# Patient Record
Sex: MALE | Race: WHITE | Employment: OTHER | URBAN - METROPOLITAN AREA
[De-identification: names, ages, dates, MRNs, and addresses within clinical notes are randomized per-mention and may not be internally consistent; named-entity substitution may affect disease eponyms.]

---

## 2017-01-01 ENCOUNTER — HOSPITAL ENCOUNTER (OUTPATIENT)
Dept: LAB | Age: 82
Discharge: HOME | End: 2017-02-27
Payer: MEDICARE

## 2017-01-01 ENCOUNTER — HOME CARE VISIT (OUTPATIENT)
Dept: HOSPICE | Facility: HOSPICE | Age: 82
End: 2017-01-01
Payer: MEDICARE

## 2017-01-01 ENCOUNTER — HOSPITAL ENCOUNTER (OUTPATIENT)
Dept: CT IMAGING | Age: 82
Discharge: HOME | End: 2017-03-22
Attending: INTERNAL MEDICINE

## 2017-01-01 ENCOUNTER — HOSPITAL ENCOUNTER (OUTPATIENT)
Dept: LAB | Age: 82
Discharge: HOME | End: 2017-02-24
Payer: MEDICARE

## 2017-01-01 ENCOUNTER — HOSPITAL ENCOUNTER (OUTPATIENT)
Dept: RADIATION ONCOLOGY | Age: 82
Discharge: HOME | End: 2017-02-21
Payer: MEDICARE

## 2017-01-01 ENCOUNTER — HOSPITAL ENCOUNTER (OUTPATIENT)
Dept: GENERAL RADIOLOGY | Age: 82
Discharge: HOME | End: 2017-03-20
Attending: INTERNAL MEDICINE
Payer: MEDICARE

## 2017-01-01 ENCOUNTER — HOSPITAL ENCOUNTER (OUTPATIENT)
Dept: LAB | Age: 82
Discharge: HOME | End: 2017-03-20
Attending: INTERNAL MEDICINE
Payer: MEDICARE

## 2017-01-01 ENCOUNTER — HOME CARE VISIT (OUTPATIENT)
Dept: SCHEDULING | Facility: HOME HEALTH | Age: 82
End: 2017-01-01
Payer: MEDICARE

## 2017-01-01 ENCOUNTER — HOSPITAL ENCOUNTER (INPATIENT)
Age: 82
LOS: 1 days | Discharge: HOME HEALTH | DRG: 478 | End: 2017-03-04
Attending: RADIOLOGY | Admitting: RADIOLOGY
Payer: MEDICARE

## 2017-01-01 ENCOUNTER — PATIENT OUTREACH (OUTPATIENT)
Dept: CASE MANAGEMENT | Age: 82
End: 2017-01-01

## 2017-01-01 ENCOUNTER — HOSPITAL ENCOUNTER (OUTPATIENT)
Dept: LAB | Age: 82
Discharge: HOME | End: 2017-04-12
Payer: MEDICARE

## 2017-01-01 ENCOUNTER — HOSPITAL ENCOUNTER (OUTPATIENT)
Dept: LAB | Age: 82
Discharge: HOME | End: 2017-03-15
Payer: MEDICARE

## 2017-01-01 ENCOUNTER — HOSPITAL ENCOUNTER (OUTPATIENT)
Dept: LAB | Age: 82
Discharge: HOME | End: 2017-03-17
Payer: MEDICARE

## 2017-01-01 ENCOUNTER — APPOINTMENT (OUTPATIENT)
Dept: INTERVENTIONAL RADIOLOGY/VASCULAR | Age: 82
DRG: 478 | End: 2017-01-01
Attending: RADIOLOGY
Payer: MEDICARE

## 2017-01-01 ENCOUNTER — HOSPITAL ENCOUNTER (OUTPATIENT)
Dept: LAB | Age: 82
Discharge: HOME | End: 2017-03-21
Payer: MEDICARE

## 2017-01-01 ENCOUNTER — DOCUMENTATION ONLY (OUTPATIENT)
Dept: ONCOLOGY | Age: 82
End: 2017-01-01

## 2017-01-01 ENCOUNTER — ANESTHESIA (OUTPATIENT)
Dept: SURGERY | Age: 82
DRG: 478 | End: 2017-01-01
Payer: MEDICARE

## 2017-01-01 ENCOUNTER — HOSPITAL ENCOUNTER (OUTPATIENT)
Dept: CT IMAGING | Age: 82
Discharge: HOME | End: 2017-03-23
Attending: INTERNAL MEDICINE
Payer: MEDICARE

## 2017-01-01 ENCOUNTER — HOSPITAL ENCOUNTER (OUTPATIENT)
Dept: LAB | Age: 82
Discharge: HOME | End: 2017-03-29
Payer: MEDICARE

## 2017-01-01 ENCOUNTER — APPOINTMENT (OUTPATIENT)
Dept: GENERAL RADIOLOGY | Age: 82
DRG: 478 | End: 2017-01-01
Attending: INTERNAL MEDICINE
Payer: MEDICARE

## 2017-01-01 ENCOUNTER — HOSPITAL ENCOUNTER (OUTPATIENT)
Dept: LAB | Age: 82
Discharge: HOME | End: 2017-02-21
Payer: MEDICARE

## 2017-01-01 ENCOUNTER — HOSPICE ADMISSION (OUTPATIENT)
Dept: HOSPICE | Facility: HOSPICE | Age: 82
End: 2017-01-01
Payer: MEDICARE

## 2017-01-01 ENCOUNTER — ANESTHESIA EVENT (OUTPATIENT)
Dept: SURGERY | Age: 82
DRG: 478 | End: 2017-01-01
Payer: MEDICARE

## 2017-01-01 ENCOUNTER — HOSPITAL ENCOUNTER (OUTPATIENT)
Dept: LAB | Age: 82
Discharge: HOME | End: 2017-07-05
Payer: MEDICARE

## 2017-01-01 ENCOUNTER — HOSPITAL ENCOUNTER (OUTPATIENT)
Dept: PET IMAGING | Age: 82
Discharge: HOME | End: 2017-03-14
Payer: MEDICARE

## 2017-01-01 VITALS
HEART RATE: 67 BPM | RESPIRATION RATE: 16 BRPM | BODY MASS INDEX: 21.47 KG/M2 | DIASTOLIC BLOOD PRESSURE: 92 MMHG | OXYGEN SATURATION: 99 % | TEMPERATURE: 97.3 F | SYSTOLIC BLOOD PRESSURE: 171 MMHG | HEIGHT: 70 IN | WEIGHT: 150 LBS

## 2017-01-01 VITALS
OXYGEN SATURATION: 99 % | WEIGHT: 150 LBS | HEART RATE: 75 BPM | TEMPERATURE: 97.8 F | RESPIRATION RATE: 14 BRPM | HEIGHT: 70 IN | SYSTOLIC BLOOD PRESSURE: 148 MMHG | BODY MASS INDEX: 21.47 KG/M2 | DIASTOLIC BLOOD PRESSURE: 70 MMHG

## 2017-01-01 VITALS
HEART RATE: 57 BPM | DIASTOLIC BLOOD PRESSURE: 88 MMHG | WEIGHT: 149.2 LBS | TEMPERATURE: 98.7 F | OXYGEN SATURATION: 97 % | BODY MASS INDEX: 21.41 KG/M2 | SYSTOLIC BLOOD PRESSURE: 148 MMHG

## 2017-01-01 VITALS — DIASTOLIC BLOOD PRESSURE: 80 MMHG | HEART RATE: 58 BPM | SYSTOLIC BLOOD PRESSURE: 150 MMHG | RESPIRATION RATE: 20 BRPM

## 2017-01-01 VITALS
TEMPERATURE: 97.2 F | DIASTOLIC BLOOD PRESSURE: 74 MMHG | HEART RATE: 80 BPM | SYSTOLIC BLOOD PRESSURE: 132 MMHG | RESPIRATION RATE: 20 BRPM

## 2017-01-01 VITALS
RESPIRATION RATE: 20 BRPM | HEART RATE: 80 BPM | DIASTOLIC BLOOD PRESSURE: 76 MMHG | TEMPERATURE: 97.2 F | SYSTOLIC BLOOD PRESSURE: 126 MMHG

## 2017-01-01 DIAGNOSIS — C64.9 RENAL CELL CARCINOMA, UNSPECIFIED LATERALITY (HCC): ICD-10-CM

## 2017-01-01 DIAGNOSIS — Z79.01 CHRONIC ANTICOAGULATION: Chronic | ICD-10-CM

## 2017-01-01 DIAGNOSIS — M89.9 LYTIC LESION OF BONE ON X-RAY: ICD-10-CM

## 2017-01-01 DIAGNOSIS — R30.0 DYSURIA: ICD-10-CM

## 2017-01-01 DIAGNOSIS — M54.9 BACK PAIN, UNSPECIFIED BACK LOCATION, UNSPECIFIED BACK PAIN LATERALITY, UNSPECIFIED CHRONICITY: ICD-10-CM

## 2017-01-01 DIAGNOSIS — C90.00 MULTIPLE MYELOMA NOT HAVING ACHIEVED REMISSION (HCC): ICD-10-CM

## 2017-01-01 DIAGNOSIS — C79.51 METASTASIS TO BONE (HCC): ICD-10-CM

## 2017-01-01 DIAGNOSIS — R97.0 ELEVATED CARCINOEMBRYONIC ANTIGEN: ICD-10-CM

## 2017-01-01 DIAGNOSIS — C64.9 RENAL CELL CANCER, UNSPECIFIED LATERALITY (HCC): ICD-10-CM

## 2017-01-01 DIAGNOSIS — C64.9 RENAL CELL ADENOCARCINOMA, UNSPECIFIED LATERALITY (HCC): ICD-10-CM

## 2017-01-01 DIAGNOSIS — S32.009A CLOSED FRACTURE OF LUMBAR VERTEBRA (HCC): ICD-10-CM

## 2017-01-01 LAB
ALBUMIN SERPL BCP-MCNC: 3.2 G/DL (ref 3.2–4.6)
ALBUMIN SERPL BCP-MCNC: 3.3 G/DL (ref 3.2–4.6)
ALBUMIN SERPL BCP-MCNC: 3.3 G/DL (ref 3.2–4.6)
ALBUMIN SERPL BCP-MCNC: 3.4 G/DL (ref 3.2–4.6)
ALBUMIN SERPL BCP-MCNC: 3.5 G/DL (ref 3.2–4.6)
ALBUMIN SERPL BCP-MCNC: 3.8 G/DL (ref 3.2–4.6)
ALBUMIN SERPL ELPH-MCNC: 3.82 G/DL (ref 3.2–5.6)
ALBUMIN UR ELPH-MCNC: 22.1 MG/DL
ALBUMIN/GLOB SERPL: 0.2 {RATIO}
ALBUMIN/GLOB SERPL: 0.8 {RATIO} (ref 1.2–3.5)
ALBUMIN/GLOB SERPL: 0.9 {RATIO} (ref 1.2–3.5)
ALBUMIN/GLOB SERPL: 1.1 {RATIO} (ref 1.2–3.5)
ALBUMIN/GLOB SERPL: 1.2 {RATIO} (ref 1.2–3.5)
ALBUMIN/GLOB SERPL: 1.3 {RATIO} (ref 1.2–3.5)
ALBUMIN/GLOB SERPL: 1.4 {RATIO} (ref 1.2–3.5)
ALBUMIN/GLOB SERPL: 1.8 {RATIO}
ALP SERPL-CCNC: 110 U/L (ref 50–136)
ALP SERPL-CCNC: 125 U/L (ref 50–136)
ALP SERPL-CCNC: 132 U/L (ref 50–136)
ALP SERPL-CCNC: 134 U/L (ref 50–136)
ALP SERPL-CCNC: 88 U/L (ref 50–136)
ALP SERPL-CCNC: 99 U/L (ref 50–136)
ALPHA1 GLOB 24H UR ELPH-MCNC: 1.9 MG/DL
ALPHA1 GLOB SERPL ELPH-MCNC: 0.23 G/DL (ref 0.1–0.4)
ALPHA2 GLOB SERPL ELPH-MCNC: 0.63 G/DL (ref 0.4–1.2)
ALPHA2 GLOB SERPL ELPH-MCNC: 4.4 MG/DL
ALT SERPL-CCNC: 13 U/L (ref 12–65)
ALT SERPL-CCNC: 15 U/L (ref 12–65)
ALT SERPL-CCNC: 17 U/L (ref 12–65)
ALT SERPL-CCNC: 22 U/L (ref 12–65)
ALT SERPL-CCNC: 23 U/L (ref 12–65)
ALT SERPL-CCNC: 24 U/L (ref 12–65)
ANION GAP BLD CALC-SCNC: 11 MMOL/L (ref 7–16)
ANION GAP BLD CALC-SCNC: 4 MMOL/L (ref 7–16)
ANION GAP BLD CALC-SCNC: 5 MMOL/L (ref 7–16)
ANION GAP BLD CALC-SCNC: 6 MMOL/L (ref 7–16)
ANION GAP BLD CALC-SCNC: 6 MMOL/L (ref 7–16)
ANION GAP BLD CALC-SCNC: 7 MMOL/L (ref 7–16)
ANION GAP BLD CALC-SCNC: 7 MMOL/L (ref 7–16)
ANION GAP BLD CALC-SCNC: 9 MMOL/L (ref 7–16)
APTT PPP: 34.2 SEC (ref 23.5–31.7)
APTT PPP: 64.8 SEC (ref 23.5–31.7)
AST SERPL W P-5'-P-CCNC: 11 U/L (ref 15–37)
AST SERPL W P-5'-P-CCNC: 11 U/L (ref 15–37)
AST SERPL W P-5'-P-CCNC: 14 U/L (ref 15–37)
AST SERPL W P-5'-P-CCNC: 16 U/L (ref 15–37)
AST SERPL W P-5'-P-CCNC: 18 U/L (ref 15–37)
AST SERPL W P-5'-P-CCNC: 18 U/L (ref 15–37)
B-GLOBULIN SERPL QL ELPH: 0.79 G/DL (ref 0.6–1.3)
B-GLOBULIN UR QL ELPH: 71.7 MG/DL
B2 MICROGLOB SERPL-MCNC: 4.7 MG/L (ref 0.8–2.34)
BASOPHILS # BLD AUTO: 0 K/UL (ref 0–0.2)
BASOPHILS # BLD: 0 % (ref 0–2)
BASOPHILS # BLD: 1 % (ref 0–2)
BILIRUB SERPL-MCNC: 0.4 MG/DL (ref 0.2–1.1)
BILIRUB SERPL-MCNC: 0.5 MG/DL (ref 0.2–1.1)
BILIRUB SERPL-MCNC: 0.5 MG/DL (ref 0.2–1.1)
BILIRUB SERPL-MCNC: 0.6 MG/DL (ref 0.2–1.1)
BILIRUB SERPL-MCNC: 0.6 MG/DL (ref 0.2–1.1)
BILIRUB SERPL-MCNC: 0.7 MG/DL (ref 0.2–1.1)
BONE MARROW PREP & W,BMA: NORMAL
BUN SERPL-MCNC: 21 MG/DL (ref 8–23)
BUN SERPL-MCNC: 23 MG/DL (ref 8–23)
BUN SERPL-MCNC: 24 MG/DL (ref 8–23)
BUN SERPL-MCNC: 24 MG/DL (ref 8–23)
BUN SERPL-MCNC: 25 MG/DL (ref 8–23)
BUN SERPL-MCNC: 25 MG/DL (ref 8–23)
BUN SERPL-MCNC: 28 MG/DL (ref 8–23)
BUN SERPL-MCNC: 28 MG/DL (ref 8–23)
CALCIUM SERPL-MCNC: 10 MG/DL (ref 8.3–10.4)
CALCIUM SERPL-MCNC: 9.1 MG/DL (ref 8.3–10.4)
CALCIUM SERPL-MCNC: 9.3 MG/DL (ref 8.3–10.4)
CALCIUM SERPL-MCNC: 9.4 MG/DL (ref 8.3–10.4)
CALCIUM SERPL-MCNC: 9.5 MG/DL (ref 8.3–10.4)
CALCIUM SERPL-MCNC: 9.6 MG/DL (ref 8.3–10.4)
CALCIUM SERPL-MCNC: 9.6 MG/DL (ref 8.3–10.4)
CALCIUM SERPL-MCNC: 9.8 MG/DL (ref 8.3–10.4)
CEA SERPL-MCNC: 6.2 NG/ML (ref 0–3)
CHLORIDE SERPL-SCNC: 106 MMOL/L (ref 98–107)
CHLORIDE SERPL-SCNC: 107 MMOL/L (ref 98–107)
CHLORIDE SERPL-SCNC: 108 MMOL/L (ref 98–107)
CHLORIDE SERPL-SCNC: 109 MMOL/L (ref 98–107)
CO2 SERPL-SCNC: 25 MMOL/L (ref 23–32)
CO2 SERPL-SCNC: 26 MMOL/L (ref 21–32)
CO2 SERPL-SCNC: 27 MMOL/L (ref 21–32)
CO2 SERPL-SCNC: 27 MMOL/L (ref 23–32)
CO2 SERPL-SCNC: 28 MMOL/L (ref 23–32)
CO2 SERPL-SCNC: 30 MMOL/L (ref 23–32)
CO2 SERPL-SCNC: 30 MMOL/L (ref 23–32)
CO2 SERPL-SCNC: 32 MMOL/L (ref 21–32)
COLLECT DURATION TIME UR: 24 HR
CREAT SERPL-MCNC: 1.49 MG/DL (ref 0.8–1.5)
CREAT SERPL-MCNC: 1.52 MG/DL (ref 0.8–1.5)
CREAT SERPL-MCNC: 1.54 MG/DL (ref 0.8–1.5)
CREAT SERPL-MCNC: 1.56 MG/DL (ref 0.8–1.5)
CREAT SERPL-MCNC: 1.56 MG/DL (ref 0.8–1.5)
CREAT SERPL-MCNC: 1.62 MG/DL (ref 0.8–1.5)
CREAT SERPL-MCNC: 1.66 MG/DL (ref 0.8–1.5)
CREAT SERPL-MCNC: 1.79 MG/DL (ref 0.8–1.5)
DIFFERENTIAL METHOD BLD: ABNORMAL
EOSINOPHIL # BLD: 0 K/UL (ref 0–0.8)
EOSINOPHIL # BLD: 0.1 K/UL (ref 0–0.8)
EOSINOPHIL # BLD: 0.2 K/UL (ref 0–0.8)
EOSINOPHIL NFR BLD: 0 % (ref 0.5–7.8)
EOSINOPHIL NFR BLD: 1 % (ref 0.5–7.8)
EOSINOPHIL NFR BLD: 2 % (ref 0.5–7.8)
EOSINOPHIL NFR BLD: 2 % (ref 0.5–7.8)
EOSINOPHIL NFR BLD: 3 % (ref 0.5–7.8)
EOSINOPHIL NFR BLD: 3 % (ref 0.5–7.8)
ERYTHROCYTE [DISTWIDTH] IN BLOOD BY AUTOMATED COUNT: 12.4 % (ref 11.9–14.6)
ERYTHROCYTE [DISTWIDTH] IN BLOOD BY AUTOMATED COUNT: 12.5 % (ref 11.9–14.6)
ERYTHROCYTE [DISTWIDTH] IN BLOOD BY AUTOMATED COUNT: 12.8 % (ref 11.9–14.6)
ERYTHROCYTE [DISTWIDTH] IN BLOOD BY AUTOMATED COUNT: 12.9 % (ref 11.9–14.6)
ERYTHROCYTE [DISTWIDTH] IN BLOOD BY AUTOMATED COUNT: 12.9 % (ref 11.9–14.6)
ERYTHROCYTE [DISTWIDTH] IN BLOOD BY AUTOMATED COUNT: 13 % (ref 11.9–14.6)
ERYTHROCYTE [DISTWIDTH] IN BLOOD BY AUTOMATED COUNT: 13.2 % (ref 11.9–14.6)
ERYTHROCYTE [DISTWIDTH] IN BLOOD BY AUTOMATED COUNT: 13.2 % (ref 11.9–14.6)
ERYTHROCYTE [DISTWIDTH] IN BLOOD BY AUTOMATED COUNT: 13.5 % (ref 11.9–14.6)
GAMMA GLOB MFR SERPL ELPH: 0.43 G/DL (ref 0.5–1.6)
GAMMA GLOB MFR UR ELPH: 12.9 MG/DL
GLOBULIN SER CALC-MCNC: 2.7 G/DL (ref 2.3–3.5)
GLOBULIN SER CALC-MCNC: 2.8 G/DL (ref 2.3–3.5)
GLOBULIN SER CALC-MCNC: 2.8 G/DL (ref 2.3–3.5)
GLOBULIN SER CALC-MCNC: 3.1 G/DL (ref 2.3–3.5)
GLOBULIN SER CALC-MCNC: 3.7 G/DL (ref 2.3–3.5)
GLOBULIN SER CALC-MCNC: 3.8 G/DL (ref 2.3–3.5)
GLUCOSE SERPL-MCNC: 101 MG/DL (ref 65–100)
GLUCOSE SERPL-MCNC: 101 MG/DL (ref 65–100)
GLUCOSE SERPL-MCNC: 104 MG/DL (ref 65–100)
GLUCOSE SERPL-MCNC: 116 MG/DL (ref 65–100)
GLUCOSE SERPL-MCNC: 120 MG/DL (ref 65–100)
GLUCOSE SERPL-MCNC: 121 MG/DL (ref 65–100)
GLUCOSE SERPL-MCNC: 171 MG/DL (ref 65–100)
GLUCOSE SERPL-MCNC: 99 MG/DL (ref 65–100)
HCT VFR BLD AUTO: 36.5 % (ref 41.1–50.3)
HCT VFR BLD AUTO: 38.7 % (ref 41.1–50.3)
HCT VFR BLD AUTO: 40.6 % (ref 41.1–50.3)
HCT VFR BLD AUTO: 40.8 % (ref 41.1–50.3)
HCT VFR BLD AUTO: 44.2 % (ref 41.1–50.3)
HCT VFR BLD AUTO: 45 % (ref 41.1–50.3)
HCT VFR BLD AUTO: 45.2 % (ref 41.1–50.3)
HCT VFR BLD AUTO: 45.5 % (ref 41.1–50.3)
HCT VFR BLD AUTO: 47.1 % (ref 41.1–50.3)
HGB BLD-MCNC: 12.4 G/DL (ref 13.6–17.2)
HGB BLD-MCNC: 12.4 G/DL (ref 13.6–17.2)
HGB BLD-MCNC: 13.2 G/DL (ref 13.6–17.2)
HGB BLD-MCNC: 13.3 G/DL (ref 13.6–17.2)
HGB BLD-MCNC: 14.4 G/DL (ref 13.6–17.2)
HGB BLD-MCNC: 14.6 G/DL (ref 13.6–17.2)
HGB BLD-MCNC: 14.9 G/DL (ref 13.6–17.2)
HGB BLD-MCNC: 15 G/DL (ref 13.6–17.2)
HGB BLD-MCNC: 15.8 G/DL (ref 13.6–17.2)
IGA SERPL-MCNC: 34 MG/DL (ref 85–499)
IGG SERPL-MCNC: 447 MG/DL (ref 610–1616)
IGM SERPL-MCNC: 28 MG/DL (ref 35–242)
IMM GRANULOCYTES # BLD: 0 K/UL (ref 0–0.5)
IMM GRANULOCYTES # BLD: 0 K/UL (ref 0–0.5)
IMM GRANULOCYTES NFR BLD AUTO: 0.2 % (ref 0–5)
IMM GRANULOCYTES NFR BLD AUTO: 0.3 % (ref 0–5)
INR BLD: NORMAL
INR BLD: NORMAL
INR PPP: 1.1 (ref 0.9–1.2)
INR PPP: 1.2 (ref 0.9–1.2)
INR PPP: 1.5 (ref 0.9–1.2)
INR PPP: 2 (ref 0.9–1.2)
INR PPP: 2.2 (ref 0.9–1.2)
INR PPP: >9 (ref 0.9–1.2)
KAPPA LC FREE SER-MCNC: 22.1 MG/L (ref 3.3–19.4)
KAPPA LC FREE SER-MCNC: 24.14 MG/L (ref 3.3–19.4)
KAPPA LC FREE/LAMBDA FREE SER: 0.01 {RATIO} (ref 0.26–1.65)
KAPPA LC FREE/LAMBDA FREE SER: 0.02 {RATIO} (ref 0.26–1.65)
LAMBDA LC FREE SERPL-MCNC: 1074 MG/L (ref 5.71–26.3)
LAMBDA LC FREE SERPL-MCNC: 3070 MG/L (ref 5.71–26.3)
LYMPHOCYTES # BLD AUTO: 11 % (ref 13–44)
LYMPHOCYTES # BLD AUTO: 17 % (ref 13–44)
LYMPHOCYTES # BLD AUTO: 21 % (ref 13–44)
LYMPHOCYTES # BLD AUTO: 22 % (ref 13–44)
LYMPHOCYTES # BLD AUTO: 27 % (ref 13–44)
LYMPHOCYTES # BLD AUTO: 30 % (ref 13–44)
LYMPHOCYTES # BLD AUTO: 7 % (ref 13–44)
LYMPHOCYTES # BLD AUTO: 8 % (ref 13–44)
LYMPHOCYTES # BLD: 0.8 K/UL (ref 0.5–4.6)
LYMPHOCYTES # BLD: 0.9 K/UL (ref 0.5–4.6)
LYMPHOCYTES # BLD: 1.1 K/UL (ref 0.5–4.6)
LYMPHOCYTES # BLD: 1.1 K/UL (ref 0.5–4.6)
LYMPHOCYTES # BLD: 1.2 K/UL (ref 0.5–4.6)
LYMPHOCYTES # BLD: 1.2 K/UL (ref 0.5–4.6)
LYMPHOCYTES # BLD: 1.4 K/UL (ref 0.5–4.6)
LYMPHOCYTES # BLD: 1.5 K/UL (ref 0.5–4.6)
M PROTEIN 24H UR ELPH-MRATE: 837 MG/24HR
M PROTEIN SERPL ELPH-MCNC: 0.22 G/DL
M PROTEIN UR-MCNC: 60.9 MG/DL
MAGNESIUM SERPL-MCNC: 2.4 MG/DL (ref 1.8–2.4)
MAGNESIUM SERPL-MCNC: 2.5 MG/DL (ref 1.8–2.4)
MAGNESIUM SERPL-MCNC: 2.6 MG/DL (ref 1.8–2.4)
MCH RBC QN AUTO: 28.8 PG (ref 26.1–32.9)
MCH RBC QN AUTO: 28.9 PG (ref 26.1–32.9)
MCH RBC QN AUTO: 29 PG (ref 26.1–32.9)
MCH RBC QN AUTO: 29 PG (ref 26.1–32.9)
MCH RBC QN AUTO: 29.5 PG (ref 26.1–32.9)
MCH RBC QN AUTO: 30.4 PG (ref 26.1–32.9)
MCHC RBC AUTO-ENTMCNC: 32 G/DL (ref 31.4–35)
MCHC RBC AUTO-ENTMCNC: 32.4 G/DL (ref 31.4–35)
MCHC RBC AUTO-ENTMCNC: 32.5 G/DL (ref 31.4–35)
MCHC RBC AUTO-ENTMCNC: 32.6 G/DL (ref 31.4–35)
MCHC RBC AUTO-ENTMCNC: 32.6 G/DL (ref 31.4–35)
MCHC RBC AUTO-ENTMCNC: 32.7 G/DL (ref 31.4–35)
MCHC RBC AUTO-ENTMCNC: 33.2 G/DL (ref 31.4–35)
MCHC RBC AUTO-ENTMCNC: 33.5 G/DL (ref 31.4–35)
MCHC RBC AUTO-ENTMCNC: 34 G/DL (ref 31.4–35)
MCV RBC AUTO: 87.4 FL (ref 79.6–97.8)
MCV RBC AUTO: 87.9 FL (ref 79.6–97.8)
MCV RBC AUTO: 88 FL (ref 79.6–97.8)
MCV RBC AUTO: 88.6 FL (ref 79.6–97.8)
MCV RBC AUTO: 88.7 FL (ref 79.6–97.8)
MCV RBC AUTO: 88.8 FL (ref 79.6–97.8)
MCV RBC AUTO: 88.9 FL (ref 79.6–97.8)
MCV RBC AUTO: 89.5 FL (ref 79.6–97.8)
MCV RBC AUTO: 89.8 FL (ref 79.6–97.8)
MM INDURATION POC: 0 MM (ref 0–5)
MM INDURATION POC: NORMAL 0 (ref 0–5)
MM INDURATION POC: NORMAL MM (ref 0–5)
MONOCYTES # BLD: 0.4 K/UL (ref 0.1–1.3)
MONOCYTES # BLD: 0.6 K/UL (ref 0.1–1.3)
MONOCYTES # BLD: 0.7 K/UL (ref 0.1–1.3)
MONOCYTES # BLD: 0.8 K/UL (ref 0.1–1.3)
MONOCYTES NFR BLD AUTO: 5 % (ref 4–12)
MONOCYTES NFR BLD AUTO: 6 % (ref 4–12)
MONOCYTES NFR BLD AUTO: 7 % (ref 4–12)
MONOCYTES NFR BLD AUTO: 9 % (ref 4–12)
MONOCYTES NFR BLD AUTO: 9 % (ref 4–12)
NEUTS SEG # BLD: 13.1 K/UL (ref 1.7–8.2)
NEUTS SEG # BLD: 2.7 K/UL (ref 1.7–8.2)
NEUTS SEG # BLD: 2.9 K/UL (ref 1.7–8.2)
NEUTS SEG # BLD: 3.7 K/UL (ref 1.7–8.2)
NEUTS SEG # BLD: 4.4 K/UL (ref 1.7–8.2)
NEUTS SEG # BLD: 4.6 K/UL (ref 1.7–8.2)
NEUTS SEG # BLD: 6.6 K/UL (ref 1.7–8.2)
NEUTS SEG # BLD: 8.6 K/UL (ref 1.7–8.2)
NEUTS SEG NFR BLD AUTO: 58 % (ref 43–78)
NEUTS SEG NFR BLD AUTO: 61 % (ref 43–78)
NEUTS SEG NFR BLD AUTO: 68 % (ref 43–78)
NEUTS SEG NFR BLD AUTO: 71 % (ref 43–78)
NEUTS SEG NFR BLD AUTO: 74 % (ref 43–78)
NEUTS SEG NFR BLD AUTO: 81 % (ref 43–78)
NEUTS SEG NFR BLD AUTO: 85 % (ref 43–78)
NEUTS SEG NFR BLD AUTO: 87 % (ref 43–78)
NRBC # BLD: 0 K/UL (ref 0–0.2)
NRBC # BLD: 0.01 K/UL (ref 0–0.2)
NRBC BLD-RTO: 0 PER 100 WBC (ref 0–2)
PLATELET # BLD AUTO: 178 K/UL (ref 150–450)
PLATELET # BLD AUTO: 187 K/UL (ref 150–450)
PLATELET # BLD AUTO: 197 K/UL (ref 150–450)
PLATELET # BLD AUTO: 209 K/UL (ref 150–450)
PLATELET # BLD AUTO: 211 K/UL (ref 150–450)
PLATELET # BLD AUTO: 247 K/UL (ref 150–450)
PLATELET # BLD AUTO: 247 K/UL (ref 150–450)
PLATELET # BLD AUTO: 372 K/UL (ref 150–450)
PLATELET # BLD AUTO: 395 K/UL (ref 150–450)
PMV BLD AUTO: 10.2 FL (ref 10.8–14.1)
PMV BLD AUTO: 10.4 FL (ref 10.8–14.1)
PMV BLD AUTO: 9.3 FL (ref 10.8–14.1)
PMV BLD AUTO: 9.3 FL (ref 10.8–14.1)
PMV BLD AUTO: 9.5 FL (ref 10.8–14.1)
PMV BLD AUTO: 9.5 FL (ref 10.8–14.1)
PMV BLD AUTO: 9.6 FL (ref 10.8–14.1)
PMV BLD AUTO: 9.6 FL (ref 10.8–14.1)
PMV BLD AUTO: 9.8 FL (ref 10.8–14.1)
POTASSIUM SERPL-SCNC: 4 MMOL/L (ref 3.5–5.1)
POTASSIUM SERPL-SCNC: 4.1 MMOL/L (ref 3.5–5.1)
POTASSIUM SERPL-SCNC: 4.2 MMOL/L (ref 3.5–5.1)
POTASSIUM SERPL-SCNC: 4.4 MMOL/L (ref 3.5–5.1)
POTASSIUM SERPL-SCNC: 4.7 MMOL/L (ref 3.5–5.1)
POTASSIUM SERPL-SCNC: 4.8 MMOL/L (ref 3.5–5.1)
PPD POC: 0 NEGATIVE
PPD POC: NORMAL NEGATIVE
PROT 24H UR-MRATE: 1554 MG/24HR
PROT PATTERN SERPL ELPH-IMP: ABNORMAL
PROT PATTERN SPEC IFE-IMP: ABNORMAL
PROT PATTERN SPEC IFE-IMP: NORMAL
PROT PATTERN UR ELPH-IMP: ABNORMAL
PROT SERPL-MCNC: 5.9 G/DL (ref 6.3–8.2)
PROT SERPL-MCNC: 6.2 G/DL (ref 6.3–8.2)
PROT SERPL-MCNC: 6.2 G/DL (ref 6.3–8.2)
PROT SERPL-MCNC: 6.4 G/DL (ref 6.3–8.2)
PROT SERPL-MCNC: 6.6 G/DL (ref 6.3–8.2)
PROT SERPL-MCNC: 7 G/DL (ref 6.3–8.2)
PROT SERPL-MCNC: 7 G/DL (ref 6.3–8.2)
PROT UR-MCNC: 113 MG/DL
PROTHROMBIN TIME: 12.4 SEC (ref 9.6–12)
PROTHROMBIN TIME: 12.6 SEC (ref 9.6–12)
PROTHROMBIN TIME: 16.1 SEC (ref 9.6–12)
PROTHROMBIN TIME: 22.3 SEC (ref 9.6–12)
PROTHROMBIN TIME: 25.9 SEC (ref 9.6–12)
PROTHROMBIN TIME: >90 SEC (ref 9.6–12)
RBC # BLD AUTO: 4.08 M/UL (ref 4.23–5.67)
RBC # BLD AUTO: 4.31 M/UL (ref 4.23–5.67)
RBC # BLD AUTO: 4.58 M/UL (ref 4.23–5.67)
RBC # BLD AUTO: 4.6 M/UL (ref 4.23–5.67)
RBC # BLD AUTO: 4.97 M/UL (ref 4.23–5.67)
RBC # BLD AUTO: 5.07 M/UL (ref 4.23–5.67)
RBC # BLD AUTO: 5.17 M/UL (ref 4.23–5.67)
RBC # BLD AUTO: 5.17 M/UL (ref 4.23–5.67)
RBC # BLD AUTO: 5.36 M/UL (ref 4.23–5.67)
SODIUM SERPL-SCNC: 140 MMOL/L (ref 136–145)
SODIUM SERPL-SCNC: 142 MMOL/L (ref 136–145)
SODIUM SERPL-SCNC: 142 MMOL/L (ref 136–145)
SODIUM SERPL-SCNC: 143 MMOL/L (ref 136–145)
SODIUM SERPL-SCNC: 143 MMOL/L (ref 136–145)
SODIUM SERPL-SCNC: 144 MMOL/L (ref 136–145)
SPECIMEN VOL ?TM UR: 1375 ML
WBC # BLD AUTO: 10 K/UL (ref 4.3–11.1)
WBC # BLD AUTO: 15.1 K/UL (ref 4.3–11.1)
WBC # BLD AUTO: 4.4 K/UL (ref 4.3–11.1)
WBC # BLD AUTO: 4.9 K/UL (ref 4.3–11.1)
WBC # BLD AUTO: 5 K/UL (ref 4.3–11.1)
WBC # BLD AUTO: 5.4 K/UL (ref 4.3–11.1)
WBC # BLD AUTO: 6.2 K/UL (ref 4.3–11.1)
WBC # BLD AUTO: 6.2 K/UL (ref 4.3–11.1)
WBC # BLD AUTO: 8.1 K/UL (ref 4.3–11.1)

## 2017-01-01 PROCEDURE — 88305 TISSUE EXAM BY PATHOLOGIST: CPT

## 2017-01-01 PROCEDURE — 99218 HC RM OBSERVATION: CPT

## 2017-01-01 PROCEDURE — 36415 COLL VENOUS BLD VENIPUNCTURE: CPT | Performed by: INTERNAL MEDICINE

## 2017-01-01 PROCEDURE — 88365 INSITU HYBRIDIZATION (FISH): CPT

## 2017-01-01 PROCEDURE — G0299 HHS/HOSPICE OF RN EA 15 MIN: HCPCS

## 2017-01-01 PROCEDURE — 85025 COMPLETE CBC W/AUTO DIFF WBC: CPT | Performed by: INTERNAL MEDICINE

## 2017-01-01 PROCEDURE — 99152 MOD SED SAME PHYS/QHP 5/>YRS: CPT

## 2017-01-01 PROCEDURE — 0Q503ZZ DESTRUCTION OF LUMBAR VERTEBRA, PERCUTANEOUS APPROACH: ICD-10-PCS | Performed by: RADIOLOGY

## 2017-01-01 PROCEDURE — 3336500001 HSPC ELECTION

## 2017-01-01 PROCEDURE — 88305 TISSUE EXAM BY PATHOLOGIST: CPT | Performed by: RADIOLOGY

## 2017-01-01 PROCEDURE — 80053 COMPREHEN METABOLIC PANEL: CPT | Performed by: INTERNAL MEDICINE

## 2017-01-01 PROCEDURE — 88368 INSITU HYBRIDIZATION MANUAL: CPT

## 2017-01-01 PROCEDURE — 74011250636 HC RX REV CODE- 250/636: Performed by: INTERNAL MEDICINE

## 2017-01-01 PROCEDURE — 77030003666 HC NDL SPINAL BD -A

## 2017-01-01 PROCEDURE — 0651 HSPC ROUTINE HOME CARE

## 2017-01-01 PROCEDURE — 73502 X-RAY EXAM HIP UNI 2-3 VIEWS: CPT

## 2017-01-01 PROCEDURE — 85730 THROMBOPLASTIN TIME PARTIAL: CPT | Performed by: INTERNAL MEDICINE

## 2017-01-01 PROCEDURE — 77074 RADEX OSSEOUS SURVEY LMTD: CPT

## 2017-01-01 PROCEDURE — A9552 F18 FDG: HCPCS

## 2017-01-01 PROCEDURE — G8978 MOBILITY CURRENT STATUS: HCPCS

## 2017-01-01 PROCEDURE — 74011000250 HC RX REV CODE- 250: Performed by: RADIOLOGY

## 2017-01-01 PROCEDURE — 0QB03ZX EXCISION OF LUMBAR VERTEBRA, PERCUTANEOUS APPROACH, DIAGNOSTIC: ICD-10-PCS | Performed by: RADIOLOGY

## 2017-01-01 PROCEDURE — 85610 PROTHROMBIN TIME: CPT | Performed by: INTERNAL MEDICINE

## 2017-01-01 PROCEDURE — G0155 HHCP-SVS OF CSW,EA 15 MIN: HCPCS

## 2017-01-01 PROCEDURE — 74011250637 HC RX REV CODE- 250/637: Performed by: INTERNAL MEDICINE

## 2017-01-01 PROCEDURE — 74011250636 HC RX REV CODE- 250/636: Performed by: RADIOLOGY

## 2017-01-01 PROCEDURE — 0QU03JZ SUPPLEMENT LUMBAR VERTEBRA WITH SYNTHETIC SUBSTITUTE, PERCUTANEOUS APPROACH: ICD-10-PCS | Performed by: RADIOLOGY

## 2017-01-01 PROCEDURE — 74011250637 HC RX REV CODE- 250/637: Performed by: PHYSICIAN ASSISTANT

## 2017-01-01 PROCEDURE — 86334 IMMUNOFIX E-PHORESIS SERUM: CPT | Performed by: INTERNAL MEDICINE

## 2017-01-01 PROCEDURE — 88342 IMHCHEM/IMCYTCHM 1ST ANTB: CPT | Performed by: RADIOLOGY

## 2017-01-01 PROCEDURE — 88237 TISSUE CULTURE BONE MARROW: CPT

## 2017-01-01 PROCEDURE — 88185 FLOWCYTOMETRY/TC ADD-ON: CPT

## 2017-01-01 PROCEDURE — 85610 PROTHROMBIN TIME: CPT | Performed by: RADIOLOGY

## 2017-01-01 PROCEDURE — 74011250636 HC RX REV CODE- 250/636: Performed by: PHYSICIAN ASSISTANT

## 2017-01-01 PROCEDURE — 83883 ASSAY NEPHELOMETRY NOT SPEC: CPT | Performed by: INTERNAL MEDICINE

## 2017-01-01 PROCEDURE — 77030011218 HC DEV BIOP BN KYPH -C

## 2017-01-01 PROCEDURE — G8979 MOBILITY GOAL STATUS: HCPCS

## 2017-01-01 PROCEDURE — 88312 SPECIAL STAINS GROUP 1: CPT

## 2017-01-01 PROCEDURE — 81050 URINALYSIS VOLUME MEASURE: CPT | Performed by: INTERNAL MEDICINE

## 2017-01-01 PROCEDURE — 85610 PROTHROMBIN TIME: CPT | Performed by: PHYSICIAN ASSISTANT

## 2017-01-01 PROCEDURE — 84166 PROTEIN E-PHORESIS/URINE/CSF: CPT | Performed by: INTERNAL MEDICINE

## 2017-01-01 PROCEDURE — 83735 ASSAY OF MAGNESIUM: CPT | Performed by: INTERNAL MEDICINE

## 2017-01-01 PROCEDURE — 88342 IMHCHEM/IMCYTCHM 1ST ANTB: CPT

## 2017-01-01 PROCEDURE — 36415 COLL VENOUS BLD VENIPUNCTURE: CPT | Performed by: PHYSICIAN ASSISTANT

## 2017-01-01 PROCEDURE — 97116 GAIT TRAINING THERAPY: CPT

## 2017-01-01 PROCEDURE — 85027 COMPLETE CBC AUTOMATED: CPT | Performed by: RADIOLOGY

## 2017-01-01 PROCEDURE — 88112 CYTOPATH CELL ENHANCE TECH: CPT

## 2017-01-01 PROCEDURE — 74011250636 HC RX REV CODE- 250/636

## 2017-01-01 PROCEDURE — 82232 ASSAY OF BETA-2 PROTEIN: CPT | Performed by: INTERNAL MEDICINE

## 2017-01-01 PROCEDURE — C1886 CATHETER, ABLATION: HCPCS

## 2017-01-01 PROCEDURE — 36415 COLL VENOUS BLD VENIPUNCTURE: CPT | Performed by: RADIOLOGY

## 2017-01-01 PROCEDURE — 76060000034 HC ANESTHESIA 1.5 TO 2 HR: Performed by: RADIOLOGY

## 2017-01-01 PROCEDURE — 20982 ABLATE BONE TUMOR(S) PERQ: CPT

## 2017-01-01 PROCEDURE — 97530 THERAPEUTIC ACTIVITIES: CPT

## 2017-01-01 PROCEDURE — 77030037492 HC KT BN ACCS OSTEOCOOL MEDT -E

## 2017-01-01 PROCEDURE — HOSPICE MEDICATION HC HH HOSPICE MEDICATION

## 2017-01-01 PROCEDURE — 88184 FLOWCYTOMETRY/ TC 1 MARKER: CPT

## 2017-01-01 PROCEDURE — 38221 DX BONE MARROW BIOPSIES: CPT

## 2017-01-01 PROCEDURE — 65270000029 HC RM PRIVATE

## 2017-01-01 PROCEDURE — 74011000302 HC RX REV CODE- 302: Performed by: INTERNAL MEDICINE

## 2017-01-01 PROCEDURE — C1713 ANCHOR/SCREW BN/BN,TIS/BN: HCPCS

## 2017-01-01 PROCEDURE — 97161 PT EVAL LOW COMPLEX 20 MIN: CPT

## 2017-01-01 PROCEDURE — 88280 CHROMOSOME KARYOTYPE STUDY: CPT

## 2017-01-01 PROCEDURE — 99211 OFF/OP EST MAY X REQ PHY/QHP: CPT

## 2017-01-01 PROCEDURE — 88311 DECALCIFY TISSUE: CPT | Performed by: RADIOLOGY

## 2017-01-01 PROCEDURE — 88364 INSITU HYBRIDIZATION (FISH): CPT

## 2017-01-01 PROCEDURE — 84165 PROTEIN E-PHORESIS SERUM: CPT | Performed by: INTERNAL MEDICINE

## 2017-01-01 PROCEDURE — 77030007884 HC KT SPN FX KYPH -I2

## 2017-01-01 PROCEDURE — 74011000250 HC RX REV CODE- 250

## 2017-01-01 PROCEDURE — 77030018846 HC SOL IRR STRL H20 ICUM -A

## 2017-01-01 PROCEDURE — 36416 COLLJ CAPILLARY BLOOD SPEC: CPT | Performed by: INTERNAL MEDICINE

## 2017-01-01 PROCEDURE — 88313 SPECIAL STAINS GROUP 2: CPT

## 2017-01-01 PROCEDURE — 80048 BASIC METABOLIC PNL TOTAL CA: CPT | Performed by: RADIOLOGY

## 2017-01-01 PROCEDURE — 88311 DECALCIFY TISSUE: CPT

## 2017-01-01 PROCEDURE — 80048 BASIC METABOLIC PNL TOTAL CA: CPT | Performed by: INTERNAL MEDICINE

## 2017-01-01 PROCEDURE — 86580 TB INTRADERMAL TEST: CPT | Performed by: INTERNAL MEDICINE

## 2017-01-01 PROCEDURE — 97165 OT EVAL LOW COMPLEX 30 MIN: CPT

## 2017-01-01 PROCEDURE — 88264 CHROMOSOME ANALYSIS 20-25: CPT

## 2017-01-01 PROCEDURE — 88377 M/PHMTRC ALYS ISHQUANT/SEMIQ: CPT

## 2017-01-01 PROCEDURE — 86335 IMMUNFIX E-PHORSIS/URINE/CSF: CPT | Performed by: INTERNAL MEDICINE

## 2017-01-01 PROCEDURE — 85730 THROMBOPLASTIN TIME PARTIAL: CPT | Performed by: RADIOLOGY

## 2017-01-01 PROCEDURE — 85025 COMPLETE CBC W/AUTO DIFF WBC: CPT | Performed by: RADIOLOGY

## 2017-01-01 RX ORDER — IBUPROFEN 600 MG/1
600 TABLET ORAL
Status: DISCONTINUED | OUTPATIENT
Start: 2017-01-01 | End: 2017-01-01 | Stop reason: HOSPADM

## 2017-01-01 RX ORDER — NALOXONE HYDROCHLORIDE 0.4 MG/ML
0.1 INJECTION, SOLUTION INTRAMUSCULAR; INTRAVENOUS; SUBCUTANEOUS
Status: CANCELLED | OUTPATIENT
Start: 2017-01-01

## 2017-01-01 RX ORDER — WARFARIN 2 MG/1
2 TABLET ORAL
Status: COMPLETED | OUTPATIENT
Start: 2017-01-01 | End: 2017-01-01

## 2017-01-01 RX ORDER — DOCUSATE SODIUM 100 MG/1
100 CAPSULE, LIQUID FILLED ORAL 2 TIMES DAILY
Status: DISCONTINUED | OUTPATIENT
Start: 2017-01-01 | End: 2017-01-01 | Stop reason: HOSPADM

## 2017-01-01 RX ORDER — SODIUM CHLORIDE 9 MG/ML
INJECTION, SOLUTION INTRAVENOUS
Status: DISCONTINUED | OUTPATIENT
Start: 2017-01-01 | End: 2017-01-01 | Stop reason: HOSPADM

## 2017-01-01 RX ORDER — OXYCODONE HYDROCHLORIDE 5 MG/1
5 TABLET ORAL
Status: DISCONTINUED | OUTPATIENT
Start: 2017-01-01 | End: 2017-01-01 | Stop reason: HOSPADM

## 2017-01-01 RX ORDER — BUPIVACAINE HYDROCHLORIDE 5 MG/ML
5-50 INJECTION, SOLUTION EPIDURAL; INTRACAUDAL ONCE
Status: CANCELLED | OUTPATIENT
Start: 2017-01-01 | End: 2017-01-01

## 2017-01-01 RX ORDER — DOCUSATE SODIUM 100 MG/1
100 CAPSULE, LIQUID FILLED ORAL 2 TIMES DAILY
Qty: 60 CAP | Refills: 0 | Status: SHIPPED | OUTPATIENT
Start: 2017-01-01 | End: 2017-01-01

## 2017-01-01 RX ORDER — WARFARIN 2.5 MG/1
2.5 TABLET ORAL EVERY EVENING
Status: DISCONTINUED | OUTPATIENT
Start: 2017-01-01 | End: 2017-01-01

## 2017-01-01 RX ORDER — MIDAZOLAM HYDROCHLORIDE 1 MG/ML
.5-2 INJECTION, SOLUTION INTRAMUSCULAR; INTRAVENOUS
Status: DISCONTINUED | OUTPATIENT
Start: 2017-01-01 | End: 2017-01-01 | Stop reason: ALTCHOICE

## 2017-01-01 RX ORDER — HYDROGEN PEROXIDE 3 %
SOLUTION, NON-ORAL MISCELLANEOUS ONCE
Status: ACTIVE | OUTPATIENT
Start: 2017-01-01 | End: 2017-01-01

## 2017-01-01 RX ORDER — LIDOCAINE HYDROCHLORIDE 10 MG/ML
0.1 INJECTION INFILTRATION; PERINEURAL AS NEEDED
Status: CANCELLED | OUTPATIENT
Start: 2017-01-01

## 2017-01-01 RX ORDER — AMLODIPINE BESYLATE 10 MG/1
10 TABLET ORAL DAILY
Qty: 90 TAB | Refills: 0 | Status: SHIPPED | OUTPATIENT
Start: 2017-01-01 | End: 2017-01-01

## 2017-01-01 RX ORDER — CEFAZOLIN SODIUM IN 0.9 % NACL 2 G/50 ML
2 INTRAVENOUS SOLUTION, PIGGYBACK (ML) INTRAVENOUS ONCE
Status: COMPLETED | OUTPATIENT
Start: 2017-01-01 | End: 2017-01-01

## 2017-01-01 RX ORDER — OXYCODONE HYDROCHLORIDE 5 MG/1
5 TABLET ORAL
Qty: 30 TAB | Refills: 0 | Status: SHIPPED | OUTPATIENT
Start: 2017-01-01 | End: 2017-01-01 | Stop reason: ALTCHOICE

## 2017-01-01 RX ORDER — LEVOTHYROXINE SODIUM 75 UG/1
75 TABLET ORAL
Status: DISCONTINUED | OUTPATIENT
Start: 2017-01-01 | End: 2017-01-01 | Stop reason: HOSPADM

## 2017-01-01 RX ORDER — LIDOCAINE HYDROCHLORIDE 20 MG/ML
.5-2 INJECTION, SOLUTION INFILTRATION; PERINEURAL ONCE
Status: COMPLETED | OUTPATIENT
Start: 2017-01-01 | End: 2017-01-01

## 2017-01-01 RX ORDER — DIPHENHYDRAMINE HYDROCHLORIDE 50 MG/ML
25-50 INJECTION, SOLUTION INTRAMUSCULAR; INTRAVENOUS ONCE
Status: DISCONTINUED | OUTPATIENT
Start: 2017-01-01 | End: 2017-01-01 | Stop reason: ALTCHOICE

## 2017-01-01 RX ORDER — SODIUM CHLORIDE 9 MG/ML
150 INJECTION, SOLUTION INTRAVENOUS CONTINUOUS
Status: ACTIVE | OUTPATIENT
Start: 2017-01-01 | End: 2017-01-01

## 2017-01-01 RX ORDER — SODIUM CHLORIDE 0.9 % (FLUSH) 0.9 %
5-10 SYRINGE (ML) INJECTION EVERY 8 HOURS
Status: DISCONTINUED | OUTPATIENT
Start: 2017-01-01 | End: 2017-01-01 | Stop reason: HOSPADM

## 2017-01-01 RX ORDER — LABETALOL HYDROCHLORIDE 5 MG/ML
INJECTION, SOLUTION INTRAVENOUS AS NEEDED
Status: DISCONTINUED | OUTPATIENT
Start: 2017-01-01 | End: 2017-01-01 | Stop reason: HOSPADM

## 2017-01-01 RX ORDER — BUPIVACAINE HYDROCHLORIDE 5 MG/ML
5-50 INJECTION, SOLUTION EPIDURAL; INTRACAUDAL ONCE
Status: COMPLETED | OUTPATIENT
Start: 2017-01-01 | End: 2017-01-01

## 2017-01-01 RX ORDER — POLYETHYLENE GLYCOL 3350 17 G/17G
17 POWDER, FOR SOLUTION ORAL DAILY
Status: DISCONTINUED | OUTPATIENT
Start: 2017-01-01 | End: 2017-01-01 | Stop reason: HOSPADM

## 2017-01-01 RX ORDER — ONDANSETRON 2 MG/ML
4 INJECTION INTRAMUSCULAR; INTRAVENOUS
Status: DISCONTINUED | OUTPATIENT
Start: 2017-01-01 | End: 2017-01-01 | Stop reason: HOSPADM

## 2017-01-01 RX ORDER — HYDRALAZINE HYDROCHLORIDE 20 MG/ML
10 INJECTION INTRAMUSCULAR; INTRAVENOUS
Status: DISCONTINUED | OUTPATIENT
Start: 2017-01-01 | End: 2017-01-01 | Stop reason: HOSPADM

## 2017-01-01 RX ORDER — FENTANYL CITRATE 50 UG/ML
INJECTION, SOLUTION INTRAMUSCULAR; INTRAVENOUS AS NEEDED
Status: DISCONTINUED | OUTPATIENT
Start: 2017-01-01 | End: 2017-01-01 | Stop reason: HOSPADM

## 2017-01-01 RX ORDER — HYDROCODONE BITARTRATE AND ACETAMINOPHEN 5; 325 MG/1; MG/1
1-2 TABLET ORAL
Status: DISCONTINUED | OUTPATIENT
Start: 2017-01-01 | End: 2017-01-01

## 2017-01-01 RX ORDER — SODIUM CHLORIDE 0.9 % (FLUSH) 0.9 %
10 SYRINGE (ML) INJECTION
Status: COMPLETED | OUTPATIENT
Start: 2017-01-01 | End: 2017-01-01

## 2017-01-01 RX ORDER — AMLODIPINE BESYLATE 10 MG/1
10 TABLET ORAL DAILY
Status: DISCONTINUED | OUTPATIENT
Start: 2017-01-01 | End: 2017-01-01 | Stop reason: HOSPADM

## 2017-01-01 RX ORDER — METAXALONE 400 MG/1
400 TABLET ORAL
Qty: 30 TAB | Refills: 0 | Status: SHIPPED | OUTPATIENT
Start: 2017-01-01 | End: 2017-01-01 | Stop reason: ALTCHOICE

## 2017-01-01 RX ORDER — MORPHINE SULFATE 4 MG/ML
4 INJECTION, SOLUTION INTRAMUSCULAR; INTRAVENOUS
Status: DISCONTINUED | OUTPATIENT
Start: 2017-01-01 | End: 2017-01-01

## 2017-01-01 RX ORDER — LIDOCAINE HYDROCHLORIDE 20 MG/ML
50-200 INJECTION, SOLUTION INFILTRATION; PERINEURAL ONCE
Status: COMPLETED | OUTPATIENT
Start: 2017-01-01 | End: 2017-01-01

## 2017-01-01 RX ORDER — SODIUM CHLORIDE 9 MG/ML
25 INJECTION, SOLUTION INTRAVENOUS ONCE
Status: COMPLETED | OUTPATIENT
Start: 2017-01-01 | End: 2017-01-01

## 2017-01-01 RX ORDER — HYDROMORPHONE HYDROCHLORIDE 1 MG/ML
1 INJECTION, SOLUTION INTRAMUSCULAR; INTRAVENOUS; SUBCUTANEOUS
Status: DISCONTINUED | OUTPATIENT
Start: 2017-01-01 | End: 2017-01-01 | Stop reason: HOSPADM

## 2017-01-01 RX ORDER — ACETAMINOPHEN 500 MG
500 TABLET ORAL
Status: DISCONTINUED | OUTPATIENT
Start: 2017-01-01 | End: 2017-01-01 | Stop reason: HOSPADM

## 2017-01-01 RX ORDER — DIPHENHYDRAMINE HYDROCHLORIDE 50 MG/ML
12.5 INJECTION, SOLUTION INTRAMUSCULAR; INTRAVENOUS
Status: CANCELLED | OUTPATIENT
Start: 2017-01-01

## 2017-01-01 RX ORDER — WARFARIN 2.5 MG/1
2.5 TABLET ORAL
Status: DISCONTINUED | OUTPATIENT
Start: 2017-01-01 | End: 2017-01-01 | Stop reason: HOSPADM

## 2017-01-01 RX ORDER — HYDROCODONE BITARTRATE AND ACETAMINOPHEN 5; 325 MG/1; MG/1
1 TABLET ORAL
Status: DISCONTINUED | OUTPATIENT
Start: 2017-01-01 | End: 2017-01-01 | Stop reason: HOSPADM

## 2017-01-01 RX ORDER — WARFARIN 2.5 MG/1
1.25 TABLET ORAL
Status: DISCONTINUED | OUTPATIENT
Start: 2017-01-01 | End: 2017-01-01 | Stop reason: HOSPADM

## 2017-01-01 RX ORDER — PRAVASTATIN SODIUM 40 MG/1
40 TABLET ORAL
COMMUNITY
End: 2017-01-01

## 2017-01-01 RX ORDER — AMLODIPINE BESYLATE 5 MG/1
5 TABLET ORAL DAILY
Status: DISCONTINUED | OUTPATIENT
Start: 2017-01-01 | End: 2017-01-01

## 2017-01-01 RX ORDER — OXYCODONE HYDROCHLORIDE 5 MG/1
10 TABLET ORAL
Status: CANCELLED | OUTPATIENT
Start: 2017-01-01

## 2017-01-01 RX ORDER — PROPOFOL 10 MG/ML
INJECTION, EMULSION INTRAVENOUS
Status: DISCONTINUED | OUTPATIENT
Start: 2017-01-01 | End: 2017-01-01 | Stop reason: HOSPADM

## 2017-01-01 RX ORDER — HYDROGEN PEROXIDE 3 %
SOLUTION, NON-ORAL MISCELLANEOUS ONCE
Status: CANCELLED | OUTPATIENT
Start: 2017-01-01 | End: 2017-01-01

## 2017-01-01 RX ORDER — METOPROLOL TARTRATE 50 MG/1
50 TABLET ORAL 2 TIMES DAILY
Status: DISCONTINUED | OUTPATIENT
Start: 2017-01-01 | End: 2017-01-01 | Stop reason: HOSPADM

## 2017-01-01 RX ORDER — OXYCODONE HYDROCHLORIDE 5 MG/1
5 TABLET ORAL
Status: CANCELLED | OUTPATIENT
Start: 2017-01-01

## 2017-01-01 RX ORDER — PRAVASTATIN SODIUM 20 MG/1
40 TABLET ORAL
Status: DISCONTINUED | OUTPATIENT
Start: 2017-01-01 | End: 2017-01-01 | Stop reason: HOSPADM

## 2017-01-01 RX ORDER — LIDOCAINE HYDROCHLORIDE 20 MG/ML
INJECTION, SOLUTION EPIDURAL; INFILTRATION; INTRACAUDAL; PERINEURAL AS NEEDED
Status: DISCONTINUED | OUTPATIENT
Start: 2017-01-01 | End: 2017-01-01 | Stop reason: HOSPADM

## 2017-01-01 RX ORDER — FENTANYL CITRATE 50 UG/ML
25-50 INJECTION, SOLUTION INTRAMUSCULAR; INTRAVENOUS
Status: DISCONTINUED | OUTPATIENT
Start: 2017-01-01 | End: 2017-01-01 | Stop reason: ALTCHOICE

## 2017-01-01 RX ORDER — SODIUM CHLORIDE 0.9 % (FLUSH) 0.9 %
5-10 SYRINGE (ML) INJECTION AS NEEDED
Status: DISCONTINUED | OUTPATIENT
Start: 2017-01-01 | End: 2017-01-01 | Stop reason: HOSPADM

## 2017-01-01 RX ORDER — PROPOFOL 10 MG/ML
INJECTION, EMULSION INTRAVENOUS AS NEEDED
Status: DISCONTINUED | OUTPATIENT
Start: 2017-01-01 | End: 2017-01-01 | Stop reason: HOSPADM

## 2017-01-01 RX ORDER — DOCUSATE SODIUM 100 MG/1
100 CAPSULE, LIQUID FILLED ORAL DAILY
Status: DISCONTINUED | OUTPATIENT
Start: 2017-01-01 | End: 2017-01-01

## 2017-01-01 RX ORDER — SODIUM CHLORIDE, SODIUM LACTATE, POTASSIUM CHLORIDE, CALCIUM CHLORIDE 600; 310; 30; 20 MG/100ML; MG/100ML; MG/100ML; MG/100ML
75 INJECTION, SOLUTION INTRAVENOUS CONTINUOUS
Status: CANCELLED | OUTPATIENT
Start: 2017-01-01 | End: 2017-01-01

## 2017-01-01 RX ORDER — METAXALONE 800 MG/1
400 TABLET ORAL
Status: DISCONTINUED | OUTPATIENT
Start: 2017-01-01 | End: 2017-01-01 | Stop reason: HOSPADM

## 2017-01-01 RX ORDER — HYDROCODONE BITARTRATE AND ACETAMINOPHEN 10; 325 MG/1; MG/1
1-2 TABLET ORAL
Status: DISCONTINUED | OUTPATIENT
Start: 2017-01-01 | End: 2017-01-01

## 2017-01-01 RX ORDER — HYDROMORPHONE HYDROCHLORIDE 2 MG/ML
0.5 INJECTION, SOLUTION INTRAMUSCULAR; INTRAVENOUS; SUBCUTANEOUS
Status: CANCELLED | OUTPATIENT
Start: 2017-01-01

## 2017-01-01 RX ORDER — SODIUM CHLORIDE, SODIUM LACTATE, POTASSIUM CHLORIDE, CALCIUM CHLORIDE 600; 310; 30; 20 MG/100ML; MG/100ML; MG/100ML; MG/100ML
75 INJECTION, SOLUTION INTRAVENOUS CONTINUOUS
Status: CANCELLED | OUTPATIENT
Start: 2017-01-01

## 2017-01-01 RX ORDER — FLUMAZENIL 0.1 MG/ML
0.2 INJECTION INTRAVENOUS AS NEEDED
Status: CANCELLED | OUTPATIENT
Start: 2017-01-01

## 2017-01-01 RX ADMIN — PRAVASTATIN SODIUM 40 MG: 20 TABLET ORAL at 20:03

## 2017-01-01 RX ADMIN — AMLODIPINE BESYLATE 10 MG: 10 TABLET ORAL at 08:29

## 2017-01-01 RX ADMIN — METOPROLOL TARTRATE 50 MG: 50 TABLET ORAL at 17:52

## 2017-01-01 RX ADMIN — Medication 10 ML: at 13:40

## 2017-01-01 RX ADMIN — MIDAZOLAM HYDROCHLORIDE 0.5 MG: 1 INJECTION, SOLUTION INTRAMUSCULAR; INTRAVENOUS at 16:36

## 2017-01-01 RX ADMIN — HYDROCODONE BITARTRATE AND ACETAMINOPHEN 2 TABLET: 10; 325 TABLET ORAL at 06:12

## 2017-01-01 RX ADMIN — HYDRALAZINE HYDROCHLORIDE 10 MG: 20 INJECTION INTRAMUSCULAR; INTRAVENOUS at 20:16

## 2017-01-01 RX ADMIN — Medication 10 ML: at 21:46

## 2017-01-01 RX ADMIN — BUPIVACAINE HYDROCHLORIDE 50 MG: 5 INJECTION, SOLUTION EPIDURAL; INTRACAUDAL at 14:29

## 2017-01-01 RX ADMIN — DOCUSATE SODIUM 100 MG: 100 CAPSULE, LIQUID FILLED ORAL at 08:29

## 2017-01-01 RX ADMIN — Medication 10 ML: at 13:45

## 2017-01-01 RX ADMIN — METAXALONE 400 MG: 800 TABLET ORAL at 08:29

## 2017-01-01 RX ADMIN — LABETALOL HYDROCHLORIDE 10 MG: 5 INJECTION, SOLUTION INTRAVENOUS at 15:26

## 2017-01-01 RX ADMIN — TUBERCULIN PURIFIED PROTEIN DERIVATIVE 5 UNITS: 5 INJECTION, SOLUTION INTRADERMAL at 10:50

## 2017-01-01 RX ADMIN — LEVOTHYROXINE SODIUM 75 MCG: 75 TABLET ORAL at 06:12

## 2017-01-01 RX ADMIN — DOCUSATE SODIUM 100 MG: 100 CAPSULE, LIQUID FILLED ORAL at 16:31

## 2017-01-01 RX ADMIN — SODIUM BICARBONATE 2 ML: 0.2 INJECTION, SOLUTION INTRAVENOUS at 14:28

## 2017-01-01 RX ADMIN — SODIUM CHLORIDE: 9 INJECTION, SOLUTION INTRAVENOUS at 13:48

## 2017-01-01 RX ADMIN — DOCUSATE SODIUM 100 MG: 100 CAPSULE, LIQUID FILLED ORAL at 15:37

## 2017-01-01 RX ADMIN — LABETALOL HYDROCHLORIDE 10 MG: 5 INJECTION, SOLUTION INTRAVENOUS at 15:31

## 2017-01-01 RX ADMIN — OXYCODONE HYDROCHLORIDE 5 MG: 5 TABLET ORAL at 17:20

## 2017-01-01 RX ADMIN — PROPOFOL 50 MCG/KG/MIN: 10 INJECTION, EMULSION INTRAVENOUS at 14:00

## 2017-01-01 RX ADMIN — WARFARIN SODIUM 1.25 MG: 2.5 TABLET ORAL at 21:09

## 2017-01-01 RX ADMIN — ONDANSETRON 4 MG: 2 INJECTION INTRAMUSCULAR; INTRAVENOUS at 08:42

## 2017-01-01 RX ADMIN — HYDROCODONE BITARTRATE AND ACETAMINOPHEN 1 TABLET: 10; 325 TABLET ORAL at 20:03

## 2017-01-01 RX ADMIN — FENTANYL CITRATE 25 MCG: 50 INJECTION, SOLUTION INTRAMUSCULAR; INTRAVENOUS at 16:28

## 2017-01-01 RX ADMIN — OXYCODONE HYDROCHLORIDE 5 MG: 5 TABLET ORAL at 21:49

## 2017-01-01 RX ADMIN — METOPROLOL TARTRATE 50 MG: 50 TABLET ORAL at 10:46

## 2017-01-01 RX ADMIN — PRAVASTATIN SODIUM 40 MG: 20 TABLET ORAL at 21:08

## 2017-01-01 RX ADMIN — METAXALONE 400 MG: 800 TABLET ORAL at 15:37

## 2017-01-01 RX ADMIN — AMLODIPINE BESYLATE 10 MG: 10 TABLET ORAL at 09:06

## 2017-01-01 RX ADMIN — PRAVASTATIN SODIUM 40 MG: 20 TABLET ORAL at 21:49

## 2017-01-01 RX ADMIN — METOPROLOL TARTRATE 50 MG: 50 TABLET ORAL at 16:57

## 2017-01-01 RX ADMIN — MIDAZOLAM HYDROCHLORIDE 0.5 MG: 1 INJECTION, SOLUTION INTRAMUSCULAR; INTRAVENOUS at 16:28

## 2017-01-01 RX ADMIN — FENTANYL CITRATE 25 MCG: 50 INJECTION, SOLUTION INTRAMUSCULAR; INTRAVENOUS at 16:36

## 2017-01-01 RX ADMIN — METOPROLOL TARTRATE 50 MG: 50 TABLET ORAL at 16:30

## 2017-01-01 RX ADMIN — FENTANYL CITRATE 25 MCG: 50 INJECTION, SOLUTION INTRAMUSCULAR; INTRAVENOUS at 14:47

## 2017-01-01 RX ADMIN — HYDRALAZINE HYDROCHLORIDE 10 MG: 20 INJECTION INTRAMUSCULAR; INTRAVENOUS at 06:17

## 2017-01-01 RX ADMIN — LIDOCAINE HYDROCHLORIDE 140 MG: 20 INJECTION, SOLUTION INFILTRATION; PERINEURAL at 16:32

## 2017-01-01 RX ADMIN — HYDROCODONE BITARTRATE AND ACETAMINOPHEN 1 TABLET: 10; 325 TABLET ORAL at 18:57

## 2017-01-01 RX ADMIN — METOPROLOL TARTRATE 50 MG: 50 TABLET ORAL at 08:29

## 2017-01-01 RX ADMIN — WARFARIN SODIUM 2 MG: 2 TABLET ORAL at 12:23

## 2017-01-01 RX ADMIN — LIDOCAINE HYDROCHLORIDE 100 MG: 20 INJECTION, SOLUTION EPIDURAL; INFILTRATION; INTRACAUDAL; PERINEURAL at 14:00

## 2017-01-01 RX ADMIN — Medication 20 ML: at 10:05

## 2017-01-01 RX ADMIN — LEVOTHYROXINE SODIUM 75 MCG: 75 TABLET ORAL at 05:58

## 2017-01-01 RX ADMIN — HYDROCODONE BITARTRATE AND ACETAMINOPHEN 2 TABLET: 10; 325 TABLET ORAL at 00:07

## 2017-01-01 RX ADMIN — MIDAZOLAM HYDROCHLORIDE 0.5 MG: 1 INJECTION, SOLUTION INTRAMUSCULAR; INTRAVENOUS at 16:22

## 2017-01-01 RX ADMIN — FENTANYL CITRATE 25 MCG: 50 INJECTION, SOLUTION INTRAMUSCULAR; INTRAVENOUS at 15:23

## 2017-01-01 RX ADMIN — Medication 10 ML: at 06:11

## 2017-01-01 RX ADMIN — FENTANYL CITRATE 25 MCG: 50 INJECTION, SOLUTION INTRAMUSCULAR; INTRAVENOUS at 14:35

## 2017-01-01 RX ADMIN — ACETAMINOPHEN 500 MG: 500 TABLET ORAL at 05:58

## 2017-01-01 RX ADMIN — AMLODIPINE BESYLATE 5 MG: 5 TABLET ORAL at 10:46

## 2017-01-01 RX ADMIN — CEFAZOLIN 2 G: 1 INJECTION, POWDER, FOR SOLUTION INTRAMUSCULAR; INTRAVENOUS; PARENTERAL at 14:01

## 2017-01-01 RX ADMIN — HYDROCODONE BITARTRATE AND ACETAMINOPHEN 1 TABLET: 10; 325 TABLET ORAL at 10:46

## 2017-01-01 RX ADMIN — HYDROMORPHONE HYDROCHLORIDE 1 MG: 1 INJECTION, SOLUTION INTRAMUSCULAR; INTRAVENOUS; SUBCUTANEOUS at 12:22

## 2017-01-01 RX ADMIN — Medication 10 ML: at 17:57

## 2017-01-01 RX ADMIN — METAXALONE 400 MG: 800 TABLET ORAL at 21:49

## 2017-01-01 RX ADMIN — MORPHINE SULFATE 4 MG: 4 INJECTION, SOLUTION INTRAMUSCULAR; INTRAVENOUS at 01:32

## 2017-01-01 RX ADMIN — LIDOCAINE HYDROCHLORIDE 50 MG: 20 INJECTION, SOLUTION INFILTRATION; PERINEURAL at 14:30

## 2017-01-01 RX ADMIN — DOCUSATE SODIUM 100 MG: 100 CAPSULE, LIQUID FILLED ORAL at 09:06

## 2017-01-01 RX ADMIN — LEVOTHYROXINE SODIUM 75 MCG: 75 TABLET ORAL at 05:09

## 2017-01-01 RX ADMIN — FENTANYL CITRATE 25 MCG: 50 INJECTION, SOLUTION INTRAMUSCULAR; INTRAVENOUS at 16:22

## 2017-01-01 RX ADMIN — METOPROLOL TARTRATE 50 MG: 50 TABLET ORAL at 09:06

## 2017-01-01 RX ADMIN — Medication 10 ML: at 05:09

## 2017-01-01 RX ADMIN — WARFARIN SODIUM 2.5 MG: 2.5 TABLET ORAL at 21:50

## 2017-01-01 RX ADMIN — Medication 10 ML: at 21:09

## 2017-01-01 RX ADMIN — PROPOFOL 50 MG: 10 INJECTION, EMULSION INTRAVENOUS at 14:00

## 2017-01-01 RX ADMIN — SODIUM CHLORIDE 25 ML/HR: 900 INJECTION, SOLUTION INTRAVENOUS at 16:20

## 2017-01-01 RX ADMIN — FENTANYL CITRATE 25 MCG: 50 INJECTION, SOLUTION INTRAMUSCULAR; INTRAVENOUS at 14:16

## 2017-02-21 NOTE — NURSE NAVIGATOR
Pt here for consult for L2 lytic lesion. C/o pain below the belt. Went to chiropractor x 10 yrs to adjust pelvis. 3-1-17-Ablation in IR. History of ablation for kidney cancer. No chemo or RT. Spouse is home from NH and pt is doing night shift. States he is not sleeping because she talks all the time. F/u with Dr. Boni Pop today. CT abdomen on 10-18-16. MRI lumbar spine 12-13-16.     Kalina Hernandez RN

## 2017-02-21 NOTE — CONSULTS
Patient: Greg Lipscomb MRN: [id number]  SSN: [id number]    YOB: 1934  Age: 80 y.o. 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 male who I am seeing at the request of Dr. Autumn Miranda regarding the potential role of radiation therapy in treatment of this metastatic renal cell carcinoma. On 06/08/15 the patient underwent cryoablation of a right renal mass under the direction of Dr. Aline Davies. CT of the abdomen on 02/11/16 showed the area of cryoablation of the right interpolar region posterior cortical mass. A small residual low soft tissue density lesion was seen to measure 1.7 x 1.9 cm without enhancement, with surrounding fat necrosis, consistent with ablated tumor. Repeat CT scan on 10/18/16 again showed post ablation changes in the upper pole of the right kidney. However, nodular tissue was seen along the lateral margin of the ablation site and felt possibly to represent locally recurrent tumor. In addition, an enlarging lytic lesion was seen at the L2 vertebral body measuring 2.6 cm. MRI of the lumbar spine on 12/13/16 showed the L2 vertebral body metastasis. Mild inferior endplate central compression was seen. The distal cord was seen to taper normally terminating at L1-L2. No involvement of the canal was seen. No other areas of bony metastatic disease were identified. He is scheduled for biopsy and possible kyphoplasty with Dr. Aline Davies on 03/01/17. At this time, Mr. Rosa Hairston is doing reasonably well. However, he has significant back pain that seems to be focused in the right sacroiliac joint. This makes it difficult for him to take care of his debilitated wife. He is interested in seeing his chiropractor with regard to this back pain. He denies constitutional symptoms.     PAST MEDICAL HISTORY: Past Medical History   Diagnosis Date    AAA (abdominal aortic aneurysm) ([facility] 11/20/2012     clipped 2005 per pt    Allergic rhinitis, cause unspecified 11/20/2012    Aneurysm ([facility]      clipped 2008-    Atrial fibrillation ([facility] 11/20/2012    [facility]     Chest pain 11/9/2015    Erectile dysfunction 11/9/2015    HTN (hypertension) 2/6/14     controlled with meds    Hypertrophy of prostate with urinary obstruction and other lower urinary tract symptoms (LUTS)     Impotence of organic origin     Kidney stones 11/20/2012    Malaise 11/9/2015    Other vitamin B12 deficiency anemia 11/20/2012    Poor historian     Right kidney mass     Unspecified adverse effect of anesthesia      delayed awakening s/p AAA repair (only time)    Unspecified disorder of kidney and ureter     Unspecified hypothyroidism 11/20/2012    Vertigo      etiology unknown       The patient denies history of collagen vascular diseases, pacemaker insertion, prior radiation or prior chemotherapy. PAST SURGICAL HISTORY:   Past Surgical History   Procedure Laterality Date    Vascular surgery procedure unlist  4/2008     abdominal aortic aneursym repair    Hx other surgical       triple A surgery    Hx other surgical       cystoscopy with stent placement    Hx hernia repair Bilateral 2000?      jovani inguinal    Hx aaa repair  2012    Hx urological       cysto - laser litho and stent multiple    Hx knee replacement  06/08/2012     Right    Hx orthopaedic  2012     partial replacement - right knee    Hx tonsillectomy  age 6       MEDICATIONS:     Current Outpatient Prescriptions:     pravastatin (PRAVACHOL) 40 mg tablet, Take 40 mg by mouth nightly., Disp: , Rfl:     fluocinoNIDE (LIDEX) 0.05 % topical cream, Apply  to affected area two (2) times a day., Disp: 60 g, Rfl: 1    metoprolol tartrate (LOPRESSOR) 50 mg tablet, TAKE 1/2 TABLET BY MOUTH EVERY 12 HOURS, Disp: 90 Tab, Rfl: 0    melatonin 3 mg tablet, Take  by mouth nightly., Disp: , Rfl:     levothyroxine (SYNTHROID) 75 mcg tablet, TAKE 1 TABLET BY MOUTH DAILY BEFORE BREAKFAST, Disp: 90 Tab, Rfl: 1    amLODIPine (NORVASC) 5 mg tablet, Take 1 Tab by mouth daily. , Disp: 90 Tab, Rfl: 3    warfarin (COUMADIN) 2.5 mg tablet, 1/2 to 1 tablet qhs or as directed  Indications: PREVENT THROMBOEMBOLISM IN CHRONIC ATRIAL FIBRILLATION (Patient taking differently: Take 2.5 mg by mouth daily. 1/2 tab Monday and Friday  Indications: PREVENT THROMBOEMBOLISM IN CHRONIC ATRIAL FIBRILLATION), Disp: 90 Tab, Rfl: 3    fluticasone (FLONASE) 50 mcg/actuation nasal spray, 2 Sprays by Both Nostrils route as needed. , Disp: , Rfl:     acetaminophen (TYLENOL) 500 mg tablet, Take  by mouth every six (6) hours as needed for Pain., Disp: , Rfl:     ALLERGIES:   Allergies   Allergen Reactions    Latex Shortness of Breath    Prednisone Other (comments)     \"I went 'bonkers' after the third round of it\"       SOCIAL HISTORY:   Social History     Social History    Marital status:      Spouse name: N/A    Number of children: N/A    Years of education: N/A     Occupational History    Not on file. Social History Main Topics    Smoking status: Never Smoker    Smokeless tobacco: Never Used    Alcohol use No    Drug use: No    Sexual activity: Not on file     Other Topics Concern    Not on file     Social History Narrative       FAMILY HISTORY:   Family History   Problem Relation Age of Onset    Other Father      kidney stones    Cancer Sister     Cancer Sister      breast       REVIEW OF SYSTEMS: Please see the completed review of systems sheet in the chart that I have reviewed today. PHYSICAL EXAMINATION:   ECOG Performance status 0-1.   VITAL SIGNS:   Visit Vitals    /88 (BP 1 Location: Left arm, BP Patient Position: Sitting)    Pulse (!) 57    Temp 98.7 °F (37.1 °C) (Oral)    Wt 67.7 kg (149 lb 3.2 oz)    SpO2 97%    BMI 21.41 kg/m2        GENERAL: The patient is well-developed, ambulatory, alert and in no acute distress. HEENT: Head is normocephalic, atraumatic. Pupils are equal, round and reactive to light and accommodation. Extraocular movement intact. NECK: Neck is supple with no masses. CARDIOVASCULAR: Heart has regular rate and rhythm. There are no murmurs, rubs or gallops. Radial pulses are 2+ RESPIRATORY: Lungs are clear to auscultation and percussion. There is normal respiratory effort. LYMPHATIC: There is no cervical or supraclavicular lymphadenopathy bilaterally. MUSCULOSKELETAL: Extremities reveal no cyanosis, clubbing or edema.  is 5+/5. There is mild pain to palpation of the right SI joint. There is no pain to palpation of the L2 vertebral body. NEURO:  Cranial nerves II-XII grossly intact. Muscular strength and sensation are intact throughout all four extremities.         PATHOLOGY:    06/08/15: DIAGNOSIS  RIGHT RENAL BIOPSY:  RENAL CELL CARCINOMA, CLEAR CELL TYPE.    LABORATORY:   Lab Results   Component Value Date/Time    Sodium 139 11/08/2016 03:20 PM    Potassium 4.3 11/08/2016 03:20 PM    Chloride 107 11/08/2016 03:20 PM    CO2 28 11/08/2016 03:20 PM    Anion gap 4 11/08/2016 03:20 PM    Glucose 89 11/08/2016 03:20 PM    BUN 21 11/08/2016 03:20 PM    Creatinine 1.69 11/08/2016 03:20 PM    GFR est AA 50 11/08/2016 03:20 PM    GFR est non-AA 42 11/08/2016 03:20 PM    Calcium 9.3 11/08/2016 03:20 PM    Magnesium 2.3 06/15/2015 06:56 AM    Phosphorus 1.9 06/10/2015 04:33 AM    Albumin 3.9 11/08/2016 03:20 PM    Protein, total 6.5 11/18/2016 01:34 PM    Globulin 2.8 11/08/2016 03:20 PM    A-G Ratio 1.6 11/18/2016 01:34 PM    AST (SGOT) 10 11/08/2016 03:20 PM    ALT (SGPT) 17 11/08/2016 03:20 PM     Lab Results   Component Value Date/Time    WBC 5.4 11/08/2016 03:20 PM    HGB 14.9 11/08/2016 03:20 PM    HCT 44.8 11/08/2016 03:20 PM    PLATELET 143 65/97/2425 03:20 PM       RADIOLOGY:    Mri Lumb Spine W Wo Cont    Result Date: 12/13/2016  Lumbar spine MRI, with and without contrast, 12/13/2016 INDICATION: 80year-old with bone metastases seen on CT. History of right renal malignancy status post cryoablation. Moderate low back pain for 9 to 10 months COMPARISON: Abdomen CT 10/18/2018 TECHNIQUE: T1, T2 and fat-suppressed enhanced sagittal images. T1,T2 and fat-suppressed enhanced axial images. T2 coronal images. 7 cc of MultiHance administered in order to evaluate metastatic disease. There is right convex scoliosis. There are no subluxations. There is abnormal signal and enhancement of within the L2 vertebral body consistent with a metastasis. There is a mild inferior endplate central compression. No additional  bony metastases identified. The distal cord tapers normally terminating at L1-L2. There is an incompletely evaluated medial right upper pole renal lesion. Please see CT report. L1-L2: No disc herniation central canal or foraminal stenosis. There is an L2 vertebral body metastasis. There is no of encroachment on the canal. There does appear to be mild involvement of the lateral and anterior paravertebral soft tissues. L2-L3: There is no disc herniation central canal or foraminal stenosis. There is facet arthropathy. L3-L4: No disc herniation central canal or significant foraminal stenosis. L4-L5: There is disc height loss. Disc bulge. Facet arthropathy creates mild central canal stenosis. The foramen are not significantly narrowed. L5-S1: There is no central canal or foraminal stenosis. There is facet arthropathy. IMPRESSION: 1. L2 vertebral body metastasis without involvement of the canal. There is an inferior endplate mild central compression fracture. 2. Medial right upper pole renal lesion. This is incompletely evaluated on this exam. See CT report. 3. L4-L5 mild canal stenosis       IMPRESSION:  Essie Zuniga is a 80 y.o. male with metastatic renal cell carcinoma with L2 vertebral body metastasis.       COUNSELING AND COORDINATION OF CARE: I have had a 60 minute consultation with Mr. Jinny Ayala of which greater than half has been spent counseling him about management options for his presumed metastatic renal cell carcinoma. He is the primary caregiver for his wife. He is very concerned with continuing his responsibilities with her. We have discussed that is likely that his back pain which seems to be focused in the sacroiliac joint may be unrelated to his presumed metastatic cancer. He does not have pain at the L2 vertebral body. He has previously been seen by a chiropractor for pain in the sacroiliac joint. He is planning to seek attention with his chiropractor for this issue. He also seems to have a recurrence in the upper pole of the right kidney now approximately year and a half after cryoablation. Finally, he has what appears to be a metastatic lesion in the L2 vertebral body. With regard to this, he is scheduled to see Dr. Hayden Saldana on 03/01/17 for biopsy and potential kyphoplasty. If this proves to be metastatic cancer, we may also consider a single fraction course of radiation therapy with [employer]. With regard to the area of recurrence at the right kidney, he will be seen Dr. Shala Schmitt today. Regarding potential local therapies, I think it would be reasonable to consider a short course of radiation to this area. He may also consider systemic treatment with Dr. Shala Schmitt. I have scheduled Mr. Jinny Ayala to return for follow-up with me on 03/06/17 for further discussion of these issues at [facility].    I appreciate the opportunity to participate in Mr. Dunia Matta mya.         Gus Mancilla MD   February 21, 2017

## 2017-03-01 NOTE — IP AVS SNAPSHOT
[address] 
[phone] Patient: Darius Coker MRN: [id number] WNI:1/4/1957 You are allergic to the following Allergen Reactions Latex Shortness of Breath Prednisone Other (comments) \"I went 'bonkers' after the third round of it\" Immunizations Administered for This Admission Name Date  
 TB Skin Test (PPD) Intradermal 3/2/2017 Recent Documentation Height Weight BMI Smoking Status 1.778 m 68 kg 21.52 kg/m2 Never Smoker Unresulted Labs Order Current Status PLEASE READ & DOCUMENT PPD TEST IN 24 HRS Preliminary result Emergency Contacts Name Discharge Info Relation Home Work Mobile [address] CAREGIVER [3] Son [22] [phone] About your hospitalization You were admitted on:  March 1, 2017 You last received care in the:  [facility] 7 MED SURG You were discharged on:  March 4, 2017 Unit phone number:  [phone] Why you were hospitalized Your primary diagnosis was:  Renal Cell Carcinoma (Hcc) Your diagnoses also included:  Metastasis To Bone (Hcc), Atrial Fibrillation (Hcc), Chronic Anticoagulation, Hypertension, Myalgia, Debility, S/P Vertebroplasty Providers Seen During Your Hospitalizations Provider Role Specialty Primary office phone Louisa Dallas MD Attending Provider Radiology [phone] Your Primary Care Physician (PCP) Primary Care Physician Office Phone Office Fax Jong Horn [phone] Follow-up Information Follow up With Details Comments Contact Info Tony Heard MD   [address] [place] [id number] 
[phone] [address] [address] 
[phone] Your Appointments  Tuesday March 14, 2017 10:45 AM EDT  
 NM PET/CT DOSE with [facility] NM PET/CT INJ RM  
[facility] PET [address]) VASILE/ Jerry [place]nn [address] [facility] [address]  
[phone] * FOR ALL APPOINTMENTS BEFORE NOON: Nothing to eat or drink after midnight except for water ONLY. * AFTERNOON APPOINTMENTS: May eat a light breakfast, but without carbohydrates or sugars (We have a list of suggested foods). They must be NPO except for water for at least 4 hours before their appointment time. Patient should be well hydrated (at least 24 oz of water). Do not participate in strenuous activity the day before or the morning before afternoon appointments. Diabetic patients should refrain from insulin 4 hours prior to their appointment. No pregnant patients may have a PET/CT exam, breast feeding mothers should pump enough breast milk for 24 hours as they will not be able to breast feed for 1 day after the scan. Contact Nuclear Medicine with any questions. Procedure takes anywhere from 2-3 hours. If the patient requires pain or anxiety medications, arrangements must be made prior to patient's arrival with their ordering physician. The patient should wait 8 weeks after radiation therapy and 2 weeks after chemotherapy has been completed. * PATIENT ARRIVAL Please report 30 minutes early to check in, except for 7 am patient 7am arrival.  
  
    
 Wednesday March 15, 2017  1:30 PM EDT  
LAB with [address]  
[facility] OUTREACH INSURANCE ([address] Jerome Mad[place] [address] [place] [phone] Wednesday March 15, 2017  2:00 PM EDT Follow Up with Artem Gibson MD  
[facility] Hematology and Oncology [place]) TANK Glynn [address] [facility] [address]  
[phone] Current Discharge Medication List  
  
START taking these medications Dose & Instructions Dispensing Information Comments Morning Noon Evening Bedtime docusate sodium 100 mg capsule Commonly known as:  Sumaya Lowers Your next dose is: Today, Tomorrow Other:  _________ Dose:  100 mg Take 1 Cap by mouth two (2) times a day for 90 days. Quantity:  60 Cap Refills:  0  
     
   
   
   
  
 metaxalone 400 mg tablet Commonly known as:  SKELAXIN Your next dose is: Today, Tomorrow Other:  _________ Dose:  400 mg Take 1 Tab by mouth three (3) times daily as needed. Quantity:  30 Tab Refills:  0  
     
   
   
   
  
 oxyCODONE IR 5 mg immediate release tablet Commonly known as:  Chares Maida Your next dose is: Today, Tomorrow Other:  _________ Dose:  5 mg Take 1 Tab by mouth every four (4) hours as needed. Max Daily Amount: 30 mg.  
 Quantity:  30 Tab Refills:  0 CONTINUE these medications which have CHANGED Dose & Instructions Dispensing Information Comments Morning Noon Evening Bedtime  
 amLODIPine 10 mg tablet Commonly known as:  Anibal Lakhani What changed:   
- medication strength 
- how much to take Your next dose is: Today, Tomorrow Other:  _________ Dose:  10 mg Take 1 Tab by mouth daily. Quantity:  90 Tab Refills:  0  
     
   
   
   
  
 warfarin 2.5 mg tablet Commonly known as:  COUMADIN What changed:   
- how much to take 
- how to take this - when to take this 
- additional instructions Your next dose is: Today, Tomorrow Other:  _________  
   
   
 1/2 to 1 tablet qhs or as directed  Indications: PREVENT THROMBOEMBOLISM IN CHRONIC ATRIAL FIBRILLATION Quantity:  90 Tab Refills:  3 CONTINUE these medications which have NOT CHANGED Dose & Instructions Dispensing Information Comments Morning Noon Evening Bedtime  
 acetaminophen 500 mg tablet Commonly known as:  TYLENOL Your next dose is: Today, Tomorrow Other:  _________ Take  by mouth every six (6) hours as needed for Pain. Refills:  0  
     
   
   
   
  
 fluocinoNIDE 0.05 % topical cream  
Commonly known as:  LIDEX Your next dose is: Today, Tomorrow Other:  _________ Apply  to affected area two (2) times a day. Quantity:  60 g Refills:  1  
     
   
   
   
  
 fluticasone 50 mcg/actuation nasal spray Commonly known as:  Mami Reges Your next dose is: Today, Tomorrow Other:  _________ Dose:  2 Spray 2 Sprays by Both Nostrils route as needed. Refills:  0  
     
   
   
   
  
 levothyroxine 75 mcg tablet Commonly known as:  SYNTHROID Your next dose is: Today, Tomorrow Other:  _________ TAKE 1 TABLET BY MOUTH DAILY BEFORE BREAKFAST Quantity:  90 Tab Refills:  1  
     
   
   
   
  
 melatonin 3 mg tablet Your next dose is: Today, Tomorrow Other:  _________ Take  by mouth nightly. Refills:  0  
     
   
   
   
  
 metoprolol tartrate 50 mg tablet Commonly known as:  LOPRESSOR Your next dose is: Today, Tomorrow Other:  _________ TAKE 1/2 TABLET BY MOUTH EVERY 12 HOURS Quantity:  90 Tab Refills:  0  
     
   
   
   
  
 pravastatin 40 mg tablet Commonly known as:  PRAVACHOL Your next dose is: Today, Tomorrow Other:  _________ Dose:  40 mg Take 40 mg by mouth nightly. Refills:  0 Where to Get Your Medications Information on where to get these meds will be given to you by the nurse or doctor. ! Ask your nurse or doctor about these medications  
  amLODIPine 10 mg tablet  
 docusate sodium 100 mg capsule  
 metaxalone 400 mg tablet  
 oxyCODONE IR 5 mg immediate release tablet Discharge Instructions None Discharge Orders None ACO Transitions of Care Introducing [employer] [address] Chel Khan offers a voluntary care coordination program to provide high quality service and care to [facility] fee-for-service beneficiaries. Viktoriya Castellano was designed to help you enhance your health and well-being through the following services: ? Transitions of Care  support for individuals who are transitioning from one care setting to another (example: Hospital to home). ? Chronic and Complex Care Coordination  support for individuals and caregivers of those with serious or chronic illnesses or with more than one chronic (ongoing) condition and those who take a number of different medications. If you meet specific medical criteria, a [facility] [address] may call you directly to coordinate your care with your primary care physician and your other care providers. For questions about the [facility] programs, please, contact your physicians office. For general questions or additional information about Accountable Care Organizations: 
Please visit www.medicare.gov/acos. html or call 1-800-MEDICARE (1-659.487.7085) TTY users should call 3-421.104.1310. [employer] Announcement We are excited to announce that we are making your provider's discharge notes available to you in [employer]. You will see these notes when they are completed and signed by the physician that discharged you from your recent hospital stay. If you have any questions or concerns about any information you see in [employer], please call the Health Information Department where you were seen or reach out to your Primary Care Provider for more information about your plan of care. General Information Please provide this summary of care documentation to your next provider. Patient Signature:  ____________________________________________________________  Date:  ____________________________________________________________  
  
Vu Devine    
    
 Provider Signature:  ____________________________________________________________ Date:  ____________________________________________________________

## 2017-03-01 NOTE — PROGRESS NOTES
TRANSFER - OUT REPORT:    Verbal report given to AMARA Brasher(name) on Nakita Luz  being transferred to [facility](unit) for routine progression of care       Report consisted of patients Situation, Background, Assessment and   Recommendations(SBAR). Information from the following report(s) SBAR was reviewed with the receiving nurse. Lines:   Peripheral IV 10/18/16 Right Antecubital (Active)       Peripheral IV 03/01/17 Right Arm (Active)   Site Assessment Clean, dry, & intact 3/1/2017 11:35 AM   Phlebitis Assessment 0 3/1/2017 11:35 AM   Infiltration Assessment 0 3/1/2017 11:35 AM   Dressing Status Clean, dry, & intact 3/1/2017 11:35 AM   Dressing Type Tape;Transparent 3/1/2017 11:35 AM   Hub Color/Line Status Flushed 3/1/2017 11:35 AM        Opportunity for questions and clarification was provided.       Patient transported with:

## 2017-03-01 NOTE — ANESTHESIA PREPROCEDURE EVALUATION
Anesthetic History     PONV          Review of Systems / Medical History  Patient summary reviewed and pertinent labs reviewed    Pulmonary  Within defined limits                 Neuro/Psych   Within defined limits           Cardiovascular    Hypertension: well controlled        Dysrhythmias : atrial fibrillation  PAD    Exercise tolerance: <4 METS  Comments: AAA--open repair 2012   GI/Hepatic/Renal         Renal disease: stones       Endo/Other      Hypothyroidism: well controlled  Cancer     Other Findings   Comments: Vertigo  ED         Physical Exam    Airway  Mallampati: II  TM Distance: 4 - 6 cm  Neck ROM: normal range of motion   Mouth opening: Normal     Cardiovascular    Rhythm: irregular  Rate: normal         Dental  No notable dental hx       Pulmonary  Breath sounds clear to auscultation               Abdominal  GI exam deferred       Other Findings            Anesthetic Plan    ASA: 3  Anesthesia type: total IV anesthesia          Induction: Intravenous  Anesthetic plan and risks discussed with: Patient

## 2017-03-01 NOTE — PROGRESS NOTES
Patient was unsuccessful at urinating prior to procedure. Patient given a urinal after but at this time, still unable to urinate. Greg Juanito on 7th floor was made aware. Patient states that he thinks once he wakes up a bit, he will be able to concentrate on being able to use the bathroom. He does have a history of difficulty with urination.

## 2017-03-01 NOTE — PROGRESS NOTES
TRANSFER - OUT REPORT:    Verbal report given to [facility] RN(name) on Ethan Issa  being transferred to IR recovery(unit) for routine progression of care       Report consisted of patients Situation, Background, Assessment and   Recommendations(SBAR). Information from the following report(s) SBAR was reviewed with the receiving nurse. Lines:   Peripheral IV 10/18/16 Right Antecubital (Active)       Peripheral IV 03/01/17 Right Arm (Active)   Site Assessment Clean, dry, & intact 3/1/2017 11:35 AM   Phlebitis Assessment 0 3/1/2017 11:35 AM   Infiltration Assessment 0 3/1/2017 11:35 AM   Dressing Status Clean, dry, & intact 3/1/2017 11:35 AM   Dressing Type Tape;Transparent 3/1/2017 11:35 AM   Hub Color/Line Status Flushed 3/1/2017 11:35 AM        Opportunity for questions and clarification was provided.       Patient transported with:

## 2017-03-01 NOTE — PROGRESS NOTES
TRANSFER - IN REPORT:    Verbal report received from [facility] (name) on Andrae Shorts  being received from IR (unit) for routine post - op      Report consisted of patients Situation, Background, Assessment and   Recommendations(SBAR). Information from the following report(s) SBAR, Kardex, Intake/Output, MAR and Recent Results was reviewed with the receiving nurse. Opportunity for questions and clarification was provided. Primary RN updated with report.

## 2017-03-01 NOTE — H&P
Department of Interventional Radiology  [phone]    History and Physical    Patient:  rSinivasa Mo MRN:  [id number]  SSN:  [id number]    YOB: 1934  Age:  80 y.o. Sex:  male      Primary Care Provider:  Robb Montes MD  Referring Physician:  Sintia Evans MD    Subjective:     Chief Complaint: back pain    History of the Present Illness: The patient is a 80 y.o. male who presents for L2 biopsy, RF ablation of a L2 lesion. Back and left buttock pain. Hx renal cell carcinoma treated with cryoablation 6/2015. NPO x meds. Coumadin held. Past Medical History:   Diagnosis Date    AAA (abdominal aortic aneurysm) ([address] 11/20/2012    clipped 2005 per pt    Allergic rhinitis, cause unspecified 11/20/2012    Aneurysm ([address]     clipped 2008-    Atrial fibrillation ([address] 11/20/2012    Cancer ([address]     Chest pain 11/9/2015    Erectile dysfunction 11/9/2015    HTN (hypertension) 2/6/14    controlled with meds    Hypertrophy of prostate with urinary obstruction and other lower urinary tract symptoms (LUTS)     Impotence of organic origin     Kidney stones 11/20/2012    Malaise 11/9/2015    Other vitamin B12 deficiency anemia 11/20/2012    Poor historian     Right kidney mass     Unspecified adverse effect of anesthesia     delayed awakening s/p AAA repair (only time)    Unspecified disorder of kidney and ureter     Unspecified hypothyroidism 11/20/2012    Vertigo     etiology unknown     Past Surgical History:   Procedure Laterality Date    HX AAA REPAIR  2012    HX HERNIA REPAIR Bilateral 2000?     jovani inguinal    HX KNEE REPLACEMENT  06/08/2012    Right    HX ORTHOPAEDIC  2012    partial replacement - right knee    HX OTHER SURGICAL      triple A surgery    HX OTHER SURGICAL      cystoscopy with stent placement    HX TONSILLECTOMY  age 6   [de-identified] UROLOGICAL      cysto - laser litho and stent multiple    VASCULAR SURGERY PROCEDURE UNLIST  4/2008    abdominal aortic aneursym repair        Review of Systems:    Pertinent items are noted in the History of Present Illness. No current facility-administered medications for this encounter. Allergies   Allergen Reactions    Latex Shortness of Breath    Prednisone Other (comments)     \"I went 'bonkers' after the third round of it\"       Family History   Problem Relation Age of Onset    Other Father      kidney stones    Cancer Sister     Cancer Sister      breast     Social History   Substance Use Topics    Smoking status: Never Smoker    Smokeless tobacco: Never Used    Alcohol use No        Objective:       Physical Examination:    Vitals:    03/01/17 1054 03/01/17 1137   BP: 157/76    Pulse: 63    Resp: 18    Temp: 97.5 °F (36.4 °C)    SpO2: 100%    Weight:  68 kg (150 lb)   Height:  5' 10\" (1.778 m)     Blood pressure 157/76, pulse 63, temperature 97.5 °F (36.4 °C), resp. rate 18, height 5' 10\" (1.778 m), weight 68 kg (150 lb), SpO2 100 %.   HEART: regular rate and rhythm  LUNG: clear to auscultation bilaterally  ABDOMEN: soft, nontender  EXTREMITIES: normal strength, tone, and muscle mass    Laboratory:     Lab Results   Component Value Date/Time    Sodium 143 02/24/2017 02:14 PM    Sodium 140 02/21/2017 04:22 PM    Potassium 4.8 02/24/2017 02:14 PM    Potassium 4.4 02/21/2017 04:22 PM    Chloride 107 02/24/2017 02:14 PM    Chloride 106 02/21/2017 04:22 PM    CO2 32 02/24/2017 02:14 PM    CO2 28 02/21/2017 04:22 PM    Anion gap 4 02/24/2017 02:14 PM    Anion gap 6 02/21/2017 04:22 PM    Glucose 99 02/24/2017 02:14 PM    Glucose 101 02/21/2017 04:22 PM    BUN 24 02/24/2017 02:14 PM    BUN 25 02/21/2017 04:22 PM    Creatinine 1.66 02/24/2017 02:14 PM    Creatinine 1.62 02/21/2017 04:22 PM    GFR est AA 51 02/24/2017 02:14 PM    GFR est AA 53 02/21/2017 04:22 PM    GFR est non-AA 42 02/24/2017 02:14 PM    GFR est non-AA 43 02/21/2017 04:22 PM    Calcium 9.5 02/24/2017 02:14 PM    Calcium 9.6 02/21/2017 04:22 PM Magnesium 2.3 06/15/2015 06:56 AM    Magnesium 1.9 06/10/2015 04:33 AM    Phosphorus 1.9 06/10/2015 04:33 AM    Phosphorus 2.8 06/09/2015 03:49 AM    Albumin 3.8 02/21/2017 04:22 PM    Albumin 3.9 11/08/2016 03:20 PM    Protein, total 6.6 02/21/2017 04:22 PM    Protein, total 6.5 11/18/2016 01:34 PM    Globulin 2.8 02/21/2017 04:22 PM    Globulin 2.8 11/08/2016 03:20 PM    A-G Ratio PENDING 02/25/2017 09:00 AM    A-G Ratio 1.4 02/21/2017 04:22 PM    AST (SGOT) 14 02/21/2017 04:22 PM    AST (SGOT) 10 11/08/2016 03:20 PM    ALT (SGPT) 17 02/21/2017 04:22 PM    ALT (SGPT) 17 11/08/2016 03:20 PM     Lab Results   Component Value Date/Time    WBC 4.9 02/24/2017 02:14 PM    WBC 5.0 02/21/2017 04:22 PM    HGB 14.6 02/24/2017 02:14 PM    HGB 14.4 02/21/2017 04:22 PM    HCT 45.0 02/24/2017 02:14 PM    HCT 44.2 02/21/2017 04:22 PM    PLATELET 785 37/91/6248 02:14 PM    PLATELET 694 94/60/8644 04:22 PM     Lab Results   Component Value Date/Time    aPTT 34.2 02/24/2017 02:14 PM    Prothrombin time 22.3 02/24/2017 02:14 PM    Prothrombin time 25.6 05/10/2016 02:40 PM    INR 2.0 02/24/2017 02:14 PM    INR 2.4 05/10/2016 02:40 PM    INR POC 2.1 02/21/2017 11:53 AM    INR POC 3.4 02/07/2017 04:14 PM       Assessment:     L2 lesion, back pain, hx renal cell    Plan:     Planned Procedure:  L2 biopsy, RFA, kyphoplasty    Risks, benefits, and alternatives reviewed with patient and he agrees to proceed with the procedure.       Signed By: Issac Singh PA-C     March 1, 2017

## 2017-03-01 NOTE — PROGRESS NOTES
Dr. Clemente Leyden stopped by to see patient. She states that she agrees that patient is stable and can be signed off of anesthesia service.

## 2017-03-01 NOTE — IP AVS SNAPSHOT
Current Discharge Medication List  
  
Take these medications at their scheduled times Dose & Instructions Dispensing Information Comments Morning Noon Evening Bedtime  
 amLODIPine 10 mg tablet Commonly known as:  Ashley Darting Your next dose is: Today, Tomorrow Other:  ____________ Dose:  10 mg Take 1 Tab by mouth daily. Quantity:  90 Tab Refills:  0  
     
   
   
   
  
 docusate sodium 100 mg capsule Commonly known as:  Henreitta Carlton Your next dose is: Today, Tomorrow Other:  ____________ Dose:  100 mg Take 1 Cap by mouth two (2) times a day for 90 days. Quantity:  60 Cap Refills:  0  
     
   
   
   
  
 fluocinoNIDE 0.05 % topical cream  
Commonly known as:  LIDEX Your next dose is: Today, Tomorrow Other:  ____________ Apply  to affected area two (2) times a day. Quantity:  60 g Refills:  1  
     
   
   
   
  
 melatonin 3 mg tablet Your next dose is: Today, Tomorrow Other:  ____________ Take  by mouth nightly. Refills:  0  
     
   
   
   
  
 pravastatin 40 mg tablet Commonly known as:  PRAVACHOL Your next dose is: Today, Tomorrow Other:  ____________ Dose:  40 mg Take 40 mg by mouth nightly. Refills:  0 Take these medications as needed Dose & Instructions Dispensing Information Comments Morning Noon Evening Bedtime  
 acetaminophen 500 mg tablet Commonly known as:  TYLENOL Your next dose is: Today, Tomorrow Other:  ____________ Take  by mouth every six (6) hours as needed for Pain. Refills:  0  
     
   
   
   
  
 fluticasone 50 mcg/actuation nasal spray Commonly known as:  Lennice Boss Your next dose is: Today, Tomorrow Other:  ____________ Dose:  2 Spray 2 Sprays by Both Nostrils route as needed. Refills:  0 metaxalone 400 mg tablet Commonly known as:  SKELAXIN Your next dose is: Today, Tomorrow Other:  ____________ Dose:  400 mg Take 1 Tab by mouth three (3) times daily as needed. Quantity:  30 Tab Refills:  0  
     
   
   
   
  
 oxyCODONE IR 5 mg immediate release tablet Commonly known as:  Gertrude Ramal Your next dose is: Today, Tomorrow Other:  ____________ Dose:  5 mg Take 1 Tab by mouth every four (4) hours as needed. Max Daily Amount: 30 mg.  
 Quantity:  30 Tab Refills:  0 Take these medications as directed Dose & Instructions Dispensing Information Comments Morning Noon Evening Bedtime  
 levothyroxine 75 mcg tablet Commonly known as:  SYNTHROID Your next dose is: Today, Tomorrow Other:  ____________ TAKE 1 TABLET BY MOUTH DAILY BEFORE BREAKFAST Quantity:  90 Tab Refills:  1  
     
   
   
   
  
 metoprolol tartrate 50 mg tablet Commonly known as:  LOPRESSOR Your next dose is: Today, Tomorrow Other:  ____________ TAKE 1/2 TABLET BY MOUTH EVERY 12 HOURS Quantity:  90 Tab Refills:  0  
     
   
   
   
  
 warfarin 2.5 mg tablet Commonly known as:  COUMADIN Your next dose is: Today, Tomorrow Other:  ____________  
   
   
 1/2 to 1 tablet qhs or as directed  Indications: PREVENT THROMBOEMBOLISM IN CHRONIC ATRIAL FIBRILLATION Quantity:  90 Tab Refills:  3 Where to Get Your Medications Information about where to get these medications is not yet available ! Ask your nurse or doctor about these medications  
  amLODIPine 10 mg tablet  
 docusate sodium 100 mg capsule  
 metaxalone 400 mg tablet  
 oxyCODONE IR 5 mg immediate release tablet

## 2017-03-01 NOTE — PROGRESS NOTES
TRANSFER - IN REPORT:    Verbal report received from [facility] RN (name) on Tom Mixer  being received from IR (unit) for routine progression of care      Report consisted of patients Situation, Background, Assessment and   Recommendations(SBAR). Information from the following report(s) Procedure Summary and MAR was reviewed with the receiving nurse. Opportunity for questions and clarification was provided. Assessment completed upon patients arrival to unit and care assumed.

## 2017-03-01 NOTE — PROCEDURES
Interventional Radiology Brief Procedure Note    Patient: Negra Hughes MRN: [id number]  SSN: [id number]    YOB: 1934  Age: 80 y.o. Sex: male      Date of Procedure: 3/1/2017    Pre-Procedure Diagnosis: met lesion L2 vertebral body    Post-Procedure Diagnosis: SAME    Procedure(s): Osteocool RFA ablation and vertebroplasty    Brief Description of Procedure: bi pedicular access , RFa and cement . Performed By: Natalia Benavides MD     Assistants: None    Anesthesia: Moderate Sedation( anesthesia service)    Estimated Blood Loss: 20 ml    Specimens: core sample in formalin    Implants: Bone cement    Findings: excellent result    Complications: None    Recommendations: 23 hour observation, then home tomorrow     Follow Up: [facility] office in 2-4 weeks  Radiation oncology with Dr Coni Pantoja.   Signed By: Natalia Benavides MD     March 1, 2017

## 2017-03-01 NOTE — PROGRESS NOTES
Interventional Radiology Prep Area Handoff      Allergies   Allergen Reactions    Latex Shortness of Breath    Prednisone Other (comments)     \"I went 'bonkers' after the third round of it\"       IRSummary reviewed. Pt identified with two identifiers. Verified procedure with Patient and IR Provider as Osteocool with Kyphoplasty. Consent obtained: Awaiting MD H&P complete/updated: Awaiting MD MD airway complete: Anesthesia    Sedation:Anesthesia NPO: Yes     Anticoagulation: Coumadin held since 2/24/2017     Pt shaved: Yes   Antibiotics: Awaiting MD  Anesthesia notified: Yes    Additional Notes: None      Lines:  Peripherally Inserted Central Catheter:     Central Venous Catheter:     Venous Access Device:     Peripheral Intravenous Line:  Peripheral IV 10/18/16 Right Antecubital (Active)       Peripheral IV 03/01/17 Right Arm (Active)   Site Assessment Clean, dry, & intact 3/1/2017 11:35 AM   Phlebitis Assessment 0 3/1/2017 11:35 AM   Infiltration Assessment 0 3/1/2017 11:35 AM   Dressing Status Clean, dry, & intact 3/1/2017 11:35 AM   Dressing Type Tape;Transparent 3/1/2017 11:35 AM   Hub Color/Line Status Flushed 3/1/2017 11:35 AM     Hemodialysis Catheter:     Drain(s):       Labs verified: Yes  No results found for this or any previous visit (from the past 24 hour(s)).   Patient Vitals for the past 8 hrs:   Temp Pulse Resp BP SpO2   03/01/17 1054 97.5 °F (36.4 °C) 63 18 157/76 100 %

## 2017-03-01 NOTE — PROGRESS NOTES
Dual skin assessment completed with David Tyson RN. Sacrum intact with no breakdown. Dermabond present to two spots on lower back from surgery. Heels boggy. Skin overall dry and fragile.

## 2017-03-02 PROBLEM — C79.51 METASTASIS TO BONE (HCC): Status: ACTIVE | Noted: 2017-01-01

## 2017-03-02 PROBLEM — Z79.01 CHRONIC ANTICOAGULATION: Chronic | Status: ACTIVE | Noted: 2017-01-01

## 2017-03-02 PROBLEM — M79.10 MYALGIA: Status: ACTIVE | Noted: 2017-01-01

## 2017-03-02 PROBLEM — I10 HYPERTENSION: Chronic | Status: ACTIVE | Noted: 2017-01-01

## 2017-03-02 PROBLEM — C64.9 RENAL CELL CARCINOMA (HCC): Status: ACTIVE | Noted: 2017-01-01

## 2017-03-02 PROBLEM — R53.81 DEBILITY: Chronic | Status: ACTIVE | Noted: 2017-01-01

## 2017-03-02 NOTE — PROGRESS NOTES
Attempted PT tx but pt and family deferred, stating pt has been having muscle spasms and increased pain. Will return tomorrow as schedule allows.     Bay An SPT

## 2017-03-02 NOTE — PROGRESS NOTES
Met with pt at bedside, pt noted sleepy but when woken up he is alert and oriented x3, pt states lives with spouse who has Alzheimers and is currently at home with 24 hour sitters, pt wishes to return home with spouse at this time. Pt denies use of DME at home for himself, son lives next door with his wife and they check on pt daily and are available if needed, PCP confirmed, Insurance confirmed Medicare and [address] with RX coverage. Medicare OBS letter delivered to pt, signed copy placed in chart and copy left at bedside with pt. Son Renae Mcnamara arrived after meeting with pt, CM returned to room and explained OBS status and that pt does not meet for inpt status and will not qualify for STR at this time. Explained pt will when medically ready be DC home and we can set up [place] with [facility] for NSG, PT/OT, HH already current for wife in home with same provider. Son verbalized acceptance of this and agreeable to plan. Order for [place] with notes faxed to [employer] [facility] per pt request.    Rolling walker order per pt. And PT request, order faxed to [address] [place] [address]. Care Management Interventions  PCP Verified by CM:  Yes (Dr Carla Strickland)  Mode of Transport at Discharge:  (family)  Transition of Care Consult (CM Consult): [address]: No  Reason Outside [place]: Patient already serviced by other home care/hospice agency (pts spouse already with [facility] would like to continue new service with them for himself)  Physical Therapy Consult: Yes  Occupational Therapy Consult: Yes  Current Support Network: Lives with Spouse, Own Home  Confirm Follow Up Transport: Family  Plan discussed with Pt/Family/Caregiver: Yes  Freedom of Choice Offered: Yes  Discharge Location  Discharge Placement: Home with home health

## 2017-03-02 NOTE — PROGRESS NOTES
Problem: Mobility Impaired (Adult and Pediatric)  Goal: *Acute Goals and Plan of Care (Insert Text)  STG:  (1.)Mr. Thang Baer will move from supine to sit and sit to supine , scoot up and down and roll side to side with MINIMAL ASSIST within 3 day(s). LTG:  (1.)Mr. Thang Baer will move from supine to sit and sit to supine , scoot up and down and roll side to side in bed with INDEPENDENT within 7 day(s). (2.)Mr. Thang Baer will transfer from bed to chair and chair to bed with MODIFIED INDEPENDENCE using the least restrictive device within 7 day(s). (3.)Mr. Thang Baer will ambulate with INDEPENDENT for 450+ feet with the least restrictive device within 7 day(s). (4.)Mr. Thang Baer will negotiate up/down 1 step with R handrail MODIFIED INDEPENDENT within 7 days. ________________________________________________________________________________________________      PHYSICAL THERAPY: INITIAL ASSESSMENT, AM 3/2/2017  OBSERVATION: Hospital Day: 2  [place] / Product Type: Medicare /      NAME/AGE/GENDER: Socorro Grande is a 80 y.o. male        PRIMARY DIAGNOSIS: Closed wedge fracture of lumbar vertebra, unspecified lumbar vertebral level, initial encounter ([facility] [address] [S32.000A] 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[facility] [address] 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HCC)  Procedure(s) (LRB):  RADIO FREQUENCY LUMBAR FOLLOWED BY AUGMENTATION  // L-2 LESION BIOPSY (N/A)  1 Day Post-Op  ICD-10: Treatment Diagnosis:       · Generalized Muscle Weakness (M62.81)  · Difficulty in walking, Not elsewhere classified (R26.2)  · Other abnormalities of gait and mobility (R26.89)  · Low Back Pain (M54.5)   Precaution/Allergies:  Latex and Prednisone       ASSESSMENT:      Mr. Thang Baer presents to physical therapy s/p above procedure. Pt is supine upon arrival, son and DIL present, and agreeable to PT assessment. C/O 2/10 pain in L hip. At baseline, Mr. Thang Baer is independent, active, driving, and AMB with 1-2 canes. He lives with wife who requires 24 hour care.  Caregivers are present 12 hours during day but are scheduled for 24 hours day for the next 1.5-2 weeks per family. Son and daughter in law live next door and can assist somewhat both both work and they have a 15year old child. Pt requires MAX verbal cues with log roll technique and MOD A x2 to sit EOB. Requires BUE support to sit to decrease pressure on L hip. Stands and AMB 50' with RW and CGA/SBA. Returned to room for ADLs, demonstrating fair static/dynamic standing balance; frequently uses head against wall to maintain balance. Transferred to chair with all needs met. Verbal cues required for posture and safety with walker management throughout. Pt will benefit from skilled PT during acute care stay to increase strength, mobility, and endurance to return to baseline level. Son and DIL state they plan for him to have home health PT at D/C and state that patient will adamantly refuse short term rehab placement (he is observation status currently which would eliminate this possibility). Mr. Connie Haro would therefore benefit from home health PT and discharge and a rolling walker to improve balance, safety, and independence. Discussed with SW. This section established at most recent assessment   PROBLEM LIST (Impairments causing functional limitations):  1. Decreased Strength  2. Decreased ADL/Functional Activities  3. Decreased Transfer Abilities  4. Decreased Ambulation Ability/Technique  5. Decreased Balance  6. Increased Pain  7. Decreased Activity Tolerance    INTERVENTIONS PLANNED: (Benefits and precautions of physical therapy have been discussed with the patient.)  1. Balance Exercise  2. Bed Mobility  3. Family Education  4. Gait Training  5. Therapeutic Activites  6. Therapeutic Exercise/Strengthening  7. Transfer Training  8.  Group Therapy      TREATMENT PLAN: Frequency/Duration: daily for duration of hospital stay  Rehabilitation Potential For Stated Goals: GOOD      RECOMMENDED REHABILITATION/EQUIPMENT: (at time of discharge pending progress): Continue Skilled Therapy, Home Health: Physical Therapy and Adaptive Equipment: Walkers, Type: Rolling Walker. HISTORY:   History of Present Injury/Illness (Reason for Referral):  Per H&P, \"The patient is a 80 y.o. male who presents for L2 biopsy, RF ablation of a L2 lesion. Back and left buttock pain. Hx renal cell carcinoma treated with cryoablation 6/2015\"  Past Medical History/Comorbidities:   Mr. Jd Olmstead  has a past medical history of AAA (abdominal aortic aneurysm) ([facility] [address] (11/20/2012); Allergic rhinitis, cause unspecified (11/20/2012); Aneurysm ([address] Atrial fibrillation ([address] (11/20/2012); Cancer ([facility] [address] Chest pain (11/9/2015); Erectile dysfunction (11/9/2015); HTN (hypertension) (2/6/14); Hypertrophy of prostate with urinary obstruction and other lower urinary tract symptoms (LUTS); Impotence of organic origin; Kidney stones (11/20/2012); Malaise (11/9/2015); Other vitamin B12 deficiency anemia (11/20/2012); Poor historian; Right kidney mass; Unspecified adverse effect of anesthesia; Unspecified disorder of kidney and ureter; Unspecified hypothyroidism (11/20/2012); and Vertigo. He also has no past medical history of Coagulation disorder ([address] Difficult intubation; Malignant hyperthermia due to anesthesia; or Pseudocholinesterase deficiency. Mr. Jd Olmstead  has a past surgical history that includes vascular surgery procedure unlist (4/2008); other surgical; other surgical; hernia repair (Bilateral, 2000?); aaa repair (2012); urological; knee replacement (06/08/2012); orthopaedic (2012); and tonsillectomy (age 6).   Social History/Living Environment:   Home Environment: Private residence  # Steps to Enter: 1  Rails to Enter: Yes  Hand Rails : Right  One/Two Story Residence: One story  Living Alone: No  Support Systems: Child(anali)  Patient Expects to be Discharged to[de-identified] Private residence  Current DME Used/Available at Home: Gege Sers, straight, Shower chair  Prior Level of Function/Work/Activity:  Mr. Jonah Butler is independent at baseline, uses cane, and drives. Provides care for wife but she is mostly assisted by caregivers present 12 hours/day. Number of Personal Factors/Comorbidities that affect the Plan of Care:  Caregiver for wife  Spinal precautions  Age 3+: HIGH COMPLEXITY   EXAMINATION:   Most Recent Physical Functioning:   Gross Assessment:  AROM: Generally decreased, functional  Strength: Generally decreased, functional  Sensation: Intact               Posture:  Posture (WDL): Exceptions to WDL  Posture Assessment: Forward head, Rounded shoulders, Trunk flexion  Balance:  Sitting: Impaired  Sitting - Static: Fair (occasional); Prop sitting  Sitting - Dynamic: Fair (occasional)  Standing: Impaired  Standing - Static: Constant support; Fair  Standing - Dynamic : Fair Bed Mobility:  Supine to Sit: Moderate assistance  Scooting: Contact guard assistance  Wheelchair Mobility:     Transfers:  Sit to Stand: Stand-by asssistance  Stand to Sit: Stand-by asssistance  Bed to Chair: Stand-by asssistance  Gait:     Base of Support: Widened  Speed/Luz: Slow  Step Length: Right shortened;Left shortened  Distance (ft): 50 Feet (ft)  Assistive Device: Walker, rolling  Ambulation - Level of Assistance: Stand-by asssistance  Interventions: Safety awareness training;Verbal cues       Body Structures Involved:  1. Bones  2. Joints  3. Muscles Body Functions Affected:  1. Sensory/Pain  2. Neuromusculoskeletal  3. Movement Related  4. Skin Related Activities and Participation Affected:  1. General Tasks and Demands  2. Mobility  3. Self Care  4. Domestic Life  5.  Community, Social and [place]   Number of elements that affect the Plan of Care: 4+: HIGH COMPLEXITY   CLINICAL PRESENTATION:   Presentation: Stable and uncomplicated: LOW COMPLEXITY   CLINICAL DECISION MAKIN [facility] Mobility Inpatient Short Form  How much difficulty does the patient currently have... Unable A Lot A Little None   1. Turning over in bed (including adjusting bedclothes, sheets and blankets)? [ ] 1   [X] 2   [ ] 3   [ ] 4   2. Sitting down on and standing up from a chair with arms ( e.g., wheelchair, bedside commode, etc.)   [ ] 1   [ ] 2   [ ] 3   [X] 4   3. Moving from lying on back to sitting on the side of the bed? [ ] 1   [X] 2   [ ] 3   [ ] 4   How much help from another person does the patient currently need. .. Total A Lot A Little None   4. Moving to and from a bed to a chair (including a wheelchair)? [ ] 1   [ ] 2   [ ] 3   [X] 4   5. Need to walk in hospital room? [ ] 1   [ ] 2   [ ] 3   [X] 4   6. Climbing 3-5 steps with a railing? [X] 1   [ ] 2   [ ] 3   [ ] 4   © 2007, Trustees of [address] [place] [address] [address] [address], under license to [employer]. All rights reserved    Score:  Initial: 17 Most Recent: X (Date: 3/2/17 )     Interpretation of Tool:  Represents activities that are increasingly more difficult (i.e. Bed mobility, Transfers, Gait). Score 24 23 22-20 19-15 14-10 9-7 6       Modifier CH CI CJ CK CL CM CN         · Mobility - Walking and Moving Around:               - CURRENT STATUS:    CK - 40%-59% impaired, limited or restricted               - GOAL STATUS:           CJ - 20%-39% impaired, limited or restricted               - D/C STATUS:                       ---------------To be determined---------------  [place] / [place] / Product Type: Medicare /       Medical Necessity:     · Patient is expected to demonstrate progress in strength, balance, coordination, functional technique and endurance to improve safety during functional mobility. Reason for Services/Other Comments:  · Patient continues to require present interventions due to patient's inability to function at baseline level .    Use of outcome tool(s) and clinical judgement create a POC that gives a: Clear prediction of patient's progress: LOW COMPLEXITY TREATMENT:   (In addition to Assessment/Re-Assessment sessions the following treatments were rendered)   Pre-treatment Symptoms/Complaints:  \"I usually use the dresser by my bed to help me sit up\"  Pain: Initial:   Pain Intensity 1: 2  Pain Location 1: Hip  Pain Orientation 1: Left  Pain Intervention(s) 1: Exercise, Repositioned  Post Session:  2/10      Therapeutic Activity: (    8 min): Therapeutic activities including Chair transfers, Toilet transfers, Ambulation on level ground and standing balance to improve mobility, strength, balance and activity tolerance. Required minimal Safety awareness training;Verbal cues to promote static and dynamic balance in standing. Braces/Orthotics/Lines/Etc:   · None  Treatment/Session Assessment:    · Response to Treatment:  Pt had difficulty with bed mobility but has improved mobility with transfers/gait  · Interdisciplinary Collaboration:  · Physical Therapist  · Registered Nurse  ·   · After treatment position/precautions:  · Up in chair  · Bed alarm/tab alert on  · Bed/Chair-wheels locked  · Call light within reach  · Family at bedside  · Compliance with Program/Exercises: Will assess as treatment progresses. · Recommendations/Intent for next treatment session: \"Next visit will focus on advancements to more challenging activities and reduction in assistance provided\".   Total Treatment Duration:  PT Patient Time In/Time Out  Time In: 1053  Time Out: [address]

## 2017-03-02 NOTE — PROGRESS NOTES
Warfarin dosing per pharmacist    Burt Pinto is a 80 y.o. male. Height: 5' 10\" (177.8 cm)    Weight: 68 kg (150 lb)    Indication:  Afib    Goal INR:  2-3    Home dose:  2.5 mg daily except 1.25 mg on Monday and Friday per care everywhere coumadin clinic note through [facility] cardiology on 2/21/17    Risk factors or significant drug interactions:  N/A    Other anticoagulants:  N/A    Daily Monitoring  Date  INR     Warfarin dose HGB              Notes  3/2  1.2  2.5 mg  15.8      INR down to 1.2 after being held since 2/24. For now, will resume patient's regular home dose of 2.5 mg daily except 1.25 mg on Monday and Friday as the patient has recently been therapeutic on this dosing regimen per outpatient anticoag clinic notes. Moving forward, will check INR daily.     Thank you,  Reva Collazo, PharmD  Clinical Pharmacist  [phone]

## 2017-03-02 NOTE — PROGRESS NOTES
Department of Interventional Radiology  [phone]                                 Progress Note  Patient: Burt Pinto MRN: [id number]  SSN: [id number]    YOB: 1934  Age: 80 y.o. Sex: male      Subjective:   Feels very weak today, continued severe left buttock pain with movement. Feels more debilitated since procedure. Reports a choking episode early this morning which has him concerned. He is urinating. Eating breakfast.     Objective:     Vitals:    03/02/17 0133 03/02/17 0341 03/02/17 0612 03/02/17 0712   BP: (!) 181/96 179/86 (!) 160/92 (!) 166/92   Pulse: 76 77  64   Resp: 18 20  18   Temp:  97.8 °F (36.6 °C)  97.5 °F (36.4 °C)   SpO2: 96% 96%  94%   Weight:       Height:          Intake and Output:       Physical Exam:   HEART: regular rate and rhythm  LUNG: clear to auscultation bilaterally  ABDOMEN: soft, nontender  EXTREMITIES: warm, no edema   MS: back access sites dry and intact    Lab/Data Review:  none this morning. 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 He is primary caregiver for his wife who has Alzheimer's-she currently has a sitter. Son-who works full time lives next door. Discussed with Dr. Joshua Wiggins and Case Management. Hospitalist to assume pt onto their service which is appreciated. PT/OT consult placed.     Signed By: Junito Moran PA-C     March 2, 2017

## 2017-03-02 NOTE — CONSULTS
Subjective:     Patient: Belkis Jarvis MRN: [id number]  SSN: [id number]    YOB: 1934  Age: 80 y.o. Sex: male        HPI: Mr. Jinny Ayala is a 81 yo WM with PMH of right renal cell carcinoma s/p cryoablation 2015 with L2 lytic lesion and possible renal tumor recurrence seen on CT AP 10,2016 s/p IR L2 kyphoplasty/biopsy/ ablation 3-1-17 admitted to observation. He has persistent severe pain and lethargy. He additionally has social stressors as his wife is under full time home care for dementia and unwell. He declines rehab    Son and daughter in law at bedside     ROS not obtained due to mentation, very withdrawn     Past Medical History:   Diagnosis Date    AAA (abdominal aortic aneurysm) ([address] 11/20/2012    clipped 2005 per pt    Allergic rhinitis, cause unspecified 11/20/2012    Aneurysm ([address]     clipped 2008-    Atrial fibrillation ([address] 11/20/2012    Cancer ([address]     Chest pain 11/9/2015    Erectile dysfunction 11/9/2015    HTN (hypertension) 2/6/14    controlled with meds    Hypertrophy of prostate with urinary obstruction and other lower urinary tract symptoms (LUTS)     Impotence of organic origin     Kidney stones 11/20/2012    Malaise 11/9/2015    Other vitamin B12 deficiency anemia 11/20/2012    Poor historian     Right kidney mass     Unspecified adverse effect of anesthesia     delayed awakening s/p AAA repair (only time)    Unspecified disorder of kidney and ureter     Unspecified hypothyroidism 11/20/2012    Vertigo     etiology unknown      Past Surgical History:   Procedure Laterality Date    HX AAA REPAIR  2012    HX HERNIA REPAIR Bilateral 2000?     jovani inguinal    HX KNEE REPLACEMENT  06/08/2012    Right    HX ORTHOPAEDIC  2012    partial replacement - right knee    HX OTHER SURGICAL      triple A surgery    HX OTHER SURGICAL      cystoscopy with stent placement    HX TONSILLECTOMY  age 6   [facility]      cysto - laser litho and stent multiple    VASCULAR SURGERY PROCEDURE UNLIST  4/2008    abdominal aortic aneursym repair      Prescriptions Prior to Admission   Medication Sig    pravastatin (PRAVACHOL) 40 mg tablet Take 40 mg by mouth nightly.  metoprolol tartrate (LOPRESSOR) 50 mg tablet TAKE 1/2 TABLET BY MOUTH EVERY 12 HOURS    melatonin 3 mg tablet Take  by mouth nightly.  levothyroxine (SYNTHROID) 75 mcg tablet TAKE 1 TABLET BY MOUTH DAILY BEFORE BREAKFAST    amLODIPine (NORVASC) 5 mg tablet Take 1 Tab by mouth daily.  fluocinoNIDE (LIDEX) 0.05 % topical cream Apply  to affected area two (2) times a day.  warfarin (COUMADIN) 2.5 mg tablet 1/2 to 1 tablet qhs or as directed  Indications: PREVENT THROMBOEMBOLISM IN CHRONIC ATRIAL FIBRILLATION (Patient taking differently: Take 2.5 mg by mouth daily. 1/2 tab Monday and Friday  Indications: PREVENT THROMBOEMBOLISM IN CHRONIC ATRIAL FIBRILLATION)    fluticasone (FLONASE) 50 mcg/actuation nasal spray 2 Sprays by Both Nostrils route as needed.  acetaminophen (TYLENOL) 500 mg tablet Take  by mouth every six (6) hours as needed for Pain.      Current Facility-Administered Medications   Medication Dose Route Frequency    warfarin (COUMADIN) tablet 2.5 mg  2.5 mg Oral QPM    HYDROcodone-acetaminophen (NORCO) 5-325 mg per tablet 1-2 Tab  1-2 Tab Oral Q4H PRN    docusate sodium (COLACE) capsule 100 mg  100 mg Oral DAILY    hydrALAZINE (APRESOLINE) 20 mg/mL injection 10 mg  10 mg IntraVENous Q6H PRN    HYDROmorphone (PF) (DILAUDID) injection 1 mg  1 mg IntraVENous Q4H PRN    metaxalone (SKELAXIN) tablet 400 mg  400 mg Oral TID PRN    acetaminophen (TYLENOL) tablet 500 mg  500 mg Oral Q6H PRN    amLODIPine (NORVASC) tablet 5 mg  5 mg Oral DAILY    levothyroxine (SYNTHROID) tablet 75 mcg  75 mcg Oral 7am    metoprolol tartrate (LOPRESSOR) tablet 50 mg  50 mg Oral BID    pravastatin (PRAVACHOL) tablet 40 mg  40 mg Oral QHS    ondansetron (ZOFRAN) injection 4 mg  4 mg IntraVENous Q8H PRN    sodium chloride (NS) flush 5-10 mL  5-10 mL IntraVENous Q8H    sodium chloride (NS) flush 5-10 mL  5-10 mL IntraVENous PRN     Allergies   Allergen Reactions    Latex Shortness of Breath    Prednisone Other (comments)     \"I went 'bonkers' after the third round of it\"      Social History   Substance Use Topics    Smoking status: Never Smoker    Smokeless tobacco: Never Used    Alcohol use No      Family History   Problem Relation Age of Onset    Other Father      kidney stones    Cancer Sister     Cancer Sister      breast        Review of Systems  Complete review of systems was not obtained due to mentation    I have reviewed all the pertinent medical and surgical history, social history, allergies, family history,  as well as pertinent labs and studies .       Objective:     Patient Vitals for the past 24 hrs:   BP Temp Pulse Resp SpO2   03/02/17 1211 187/86 97.6 °F (36.4 °C) 72 20 97 %   03/02/17 0712 (!) 166/92 97.5 °F (36.4 °C) 64 18 94 %   03/02/17 0612 (!) 160/92 - - - -   03/02/17 0341 179/86 97.8 °F (36.6 °C) 77 20 96 %   03/02/17 0133 (!) 181/96 - 76 18 96 %   03/01/17 2234 152/81 97.6 °F (36.4 °C) 80 18 98 %   03/01/17 1935 140/76 97.4 °F (36.3 °C) 75 20 96 %   03/01/17 1822 129/87 - 77 - 96 %   03/01/17 1807 176/78 - 81 - 98 %   03/01/17 1757 173/75 - 81 - -   03/01/17 1752 173/75 - 81 - -   03/01/17 1737 169/74 - 77 - 96 %   03/01/17 1722 (!) 136/91 97.5 °F (36.4 °C) 75 15 92 %   03/01/17 1654 178/84 - 76 16 92 %   03/01/17 1649 169/79 - 77 15 95 %   03/01/17 1644 177/88 - 80 15 94 %   03/01/17 1639 157/70 - - 16 94 %   03/01/17 1634 174/89 - 77 15 93 %   03/01/17 1624 171/67 - 76 15 94 %   03/01/17 1619 154/73 - 75 16 91 %   03/01/17 1614 162/75 - 75 15 95 %   03/01/17 1610 152/79 - 68 14 96 %   03/01/17 1609 150/81 - 79 15 94 %   03/01/17 1605 151/79 - 69 14 95 %   03/01/17 1604 169/84 - 67 16 96 %   03/01/17 1600 150/81 - 71 16 96 %   03/01/17 1555 161/81 - 74 14 95 %   03/01/17 1550 165/80 - 72 16 95 %   03/01/17 1545 179/78 - 70 16 96 %   03/01/17 1541 (!) 191/91 98 °F (36.7 °C) 71 16 95 %     03/02 0701 - 03/02 1900  In: -   Out: 200 [Urine:200]  02/28 1901 - 03/02 0700  In: 950 [I.V.:950]  Out: 575 [Urine:575]    Exam:  General: elderly, lethargic, no distress   HEENT:  nasal septum midline  Neck: supple, symmetrical, trachea midline, no adenopathy,no carotid bruit and no JVD  Lungs: clear to auscultation bilaterally, good effort   Heart: regular rate and rhythm, S1, S2 normal, no murmur, click, rub or gallop, no edema   Abdomen: soft, non-tender. Bowel sounds normal. No masses,  no organomegaly  Extremities: extremities normal, atraumatic, no cyanosis or edema  Skin: Skin color, texture, turgor normal. No rashes or lesions  Neurologic:lethargic  Musculoskeletal: no gross deficits   Psychiatric: lethargic    ECG:     Data Review (Labs):   No results found for this or any previous visit (from the past 24 hour(s)).     Assessment / Plan:   Principal Problem:    Renal cell carcinoma ([facility] [address] (3/2/2017)    Active Problems:    Atrial fibrillation ([facility] [address] (11/20/2012)      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[facility] [address] (3/2/2017)      Chronic anticoagulation (3/2/2017)      Hypertension (3/2/2017)      Myalgia (3/2/2017)      Debility (3/2/2017)      -will assume care from IR due to refractory pain and debility   -change to IV dilaudid prn with oral oxycodone prn, add skelaxin for myalgias   -check labs, follow INR daily at on coumadin, consult pharmacy   -continue home meds, add prn hydralazine  -outpatient oncology followup  -PT/OT/SW for dispo    DVT Prophylaxis: coumadin  FEN:oral  Code Status:   PMD:   Care Plan addressed:son     Signed By: Makenzie Terrell MD     March 2, 2017

## 2017-03-03 PROBLEM — Z98.890 S/P VERTEBROPLASTY: Status: ACTIVE | Noted: 2017-01-01

## 2017-03-03 NOTE — PROGRESS NOTES
Problem: Mobility Impaired (Adult and Pediatric)  Goal: *Acute Goals and Plan of Care (Insert Text)  STG:  (1.)Mr. Rosa Hairston will move from supine to sit and sit to supine , scoot up and down and roll side to side with MINIMAL ASSIST within 3 day(s). LTG:  (1.)Mr. Rosa Hairston will move from supine to sit and sit to supine , scoot up and down and roll side to side in bed with INDEPENDENT within 7 day(s). (2.)Mr. Rosa Hairston will transfer from bed to chair and chair to bed with MODIFIED INDEPENDENCE using the least restrictive device within 7 day(s). (3.)Mr. Rosa Hairston will ambulate with INDEPENDENT for 450+ feet with the least restrictive device within 7 day(s). (4.)Mr. Rosa Hairston will negotiate up/down 1 step with R handrail MODIFIED INDEPENDENT within 7 days. ________________________________________________________________________________________________      PHYSICAL THERAPY: Daily Note, Treatment Day: 1st and PM 3/3/2017  INPATIENT: Hospital Day: 3  [place] / [place] / Product Type: Medicare /      NAME/AGE/GENDER: Greg Lipscomb is a 80 y.o. male        PRIMARY DIAGNOSIS: Closed wedge fracture of lumbar vertebra, unspecified lumbar vertebral level, initial encounter ([facility] [S32.000A] Renal cell carcinoma (HCC) Renal cell carcinoma ([facility] [address]  Procedure(s) (LRB):  RADIO FREQUENCY LUMBAR FOLLOWED BY AUGMENTATION  // L-2 LESION BIOPSY (N/A)  2 Days Post-Op  ICD-10: Treatment Diagnosis:       · Generalized Muscle Weakness (M62.81)  · Difficulty in walking, Not elsewhere classified (R26.2)  · Other abnormalities of gait and mobility (R26.89)  · Low Back Pain (M54.5)   Precaution/Allergies:  Latex and Prednisone       ASSESSMENT:      Mr. Rosa Hairston presents to physical therapy s/p above procedure. Pt is supine upon arrival and agreeable to PT treatment. C/O 2/10 pain in L hip and reports he feels confused. He requires MIN verbal cues for log roll technique and sits to EOB with MIN A.  Stands and slowly ' with RW and CGA/SBA requiring multiple standing rest breaks. Returned to chair with all needs met. Improvement this session with decreased assistance required for bed mobility and increased AMB distance. Will continue POC. This section established at most recent assessment   PROBLEM LIST (Impairments causing functional limitations):  1. Decreased Strength  2. Decreased ADL/Functional Activities  3. Decreased Transfer Abilities  4. Decreased Ambulation Ability/Technique  5. Decreased Balance  6. Increased Pain  7. Decreased Activity Tolerance    INTERVENTIONS PLANNED: (Benefits and precautions of physical therapy have been discussed with the patient.)  1. Balance Exercise  2. Bed Mobility  3. Family Education  4. Gait Training  5. Therapeutic Activites  6. Therapeutic Exercise/Strengthening  7. Transfer Training  8. Group Therapy      TREATMENT PLAN: Frequency/Duration: daily for duration of hospital stay  Rehabilitation Potential For Stated Goals: GOOD      RECOMMENDED REHABILITATION/EQUIPMENT: (at time of discharge pending progress): Continue Skilled Therapy, Home Health: Physical Therapy and Adaptive Equipment: Walkers, Type: Rolling Walker. HISTORY:   History of Present Injury/Illness (Reason for Referral):  Per H&P, \"The patient is a 80 y.o. male who presents for L2 biopsy, RF ablation of a L2 lesion. Back and left buttock pain. Hx renal cell carcinoma treated with cryoablation 6/2015\"  Past Medical History/Comorbidities:   Mr. Cuate Silveira  has a past medical history of AAA (abdominal aortic aneurysm) ([facility] [address] (11/20/2012); Allergic rhinitis, cause unspecified (11/20/2012); Aneurysm ([address] Atrial fibrillation ([address] (11/20/2012); Cancer ([address] Chest pain (11/9/2015); Erectile dysfunction (11/9/2015); HTN (hypertension) (2/6/14); Hypertrophy of prostate with urinary obstruction and other lower urinary tract symptoms (LUTS); Impotence of organic origin; Kidney stones (11/20/2012); Malaise (11/9/2015);  Other vitamin B12 deficiency anemia (11/20/2012); Poor historian; Right kidney mass; Unspecified adverse effect of anesthesia; Unspecified disorder of kidney and ureter; Unspecified hypothyroidism (11/20/2012); and Vertigo. He also has no past medical history of Coagulation disorder ([address] Difficult intubation; Malignant hyperthermia due to anesthesia; or Pseudocholinesterase deficiency. Mr. Ethel Tay  has a past surgical history that includes vascular surgery procedure unlist (4/2008); other surgical; other surgical; hernia repair (Bilateral, 2000?); aaa repair (2012); urological; knee replacement (06/08/2012); orthopaedic (2012); and tonsillectomy (age 6). Social History/Living Environment:   Home Environment: Private residence  # Steps to Enter: 1  Rails to Enter: Yes  Hand Rails : Right  One/Two Story Residence: One story  Living Alone: No  Support Systems: Child(anali)  Patient Expects to be Discharged to[de-identified] Private residence  Current DME Used/Available at Home: luzmaria Griffin, Shower chair  Prior Level of Function/Work/Activity:  Mr. Ethel Tay is independent at baseline, uses cane, and drives. Provides care for wife but she is mostly assisted by caregivers present 12 hours/day. Number of Personal Factors/Comorbidities that affect the Plan of Care:  Caregiver for wife  Spinal precautions  Age 3+: HIGH COMPLEXITY   EXAMINATION:   Most Recent Physical Functioning:   Gross Assessment:  AROM: Generally decreased, functional  Strength: Generally decreased, functional  Sensation: Intact               Posture:  Posture (WDL): Exceptions to WDL  Posture Assessment: Forward head, Rounded shoulders, Trunk flexion  Balance:  Sitting: Intact  Sitting - Static: Good (unsupported)  Sitting - Dynamic: Good (unsupported)  Standing: Impaired  Standing - Static: Constant support; Fair  Standing - Dynamic : Fair Bed Mobility:  Supine to Sit: Minimum assistance  Scooting: Supervision  Wheelchair Mobility:     Transfers:  Sit to Stand: Contact guard assistance  Stand to Sit: Stand-by asssistance  Bed to Chair: Contact guard assistance  Gait:     Base of Support: Widened  Speed/Luz: Slow;Pace decreased (<100 feet/min)  Step Length: Left shortened;Right shortened  Distance (ft): 125 Feet (ft)  Assistive Device: Walker, rolling  Ambulation - Level of Assistance: Contact guard assistance  Interventions: Safety awareness training;Verbal cues  Duration: 13 Minutes       Body Structures Involved:  1. Bones  2. Joints  3. Muscles Body Functions Affected:  1. Sensory/Pain  2. Neuromusculoskeletal  3. Movement Related  4. Skin Related Activities and Participation Affected:  1. General Tasks and Demands  2. Mobility  3. Self Care  4. Domestic Life  5. Community, Social and [place]   Number of elements that affect the Plan of Care: 4+: HIGH COMPLEXITY   CLINICAL PRESENTATION:   Presentation: Stable and uncomplicated: LOW COMPLEXITY   CLINICAL DECISION MAKIN [facility] Mobility Inpatient Short Form  How much difficulty does the patient currently have. .. Unable A Lot A Little None   1. Turning over in bed (including adjusting bedclothes, sheets and blankets)? [ ] 1   [X] 2   [ ] 3   [ ] 4   2. Sitting down on and standing up from a chair with arms ( e.g., wheelchair, bedside commode, etc.)   [ ] 1   [ ] 2   [ ] 3   [X] 4   3. Moving from lying on back to sitting on the side of the bed? [ ] 1   [X] 2   [ ] 3   [ ] 4   How much help from another person does the patient currently need. .. Total A Lot A Little None   4. Moving to and from a bed to a chair (including a wheelchair)? [ ] 1   [ ] 2   [ ] 3   [X] 4   5. Need to walk in hospital room? [ ] 1   [ ] 2   [ ] 3   [X] 4   6. Climbing 3-5 steps with a railing? [X] 1   [ ] 2   [ ] 3   [ ] 4   © , Trustees of [address] [place] [address] [address] [address], under license to [employer].  All rights reserved    Score:  Initial: 17 Most Recent: X (Date: 3/2/17 )     Interpretation of Tool: Represents activities that are increasingly more difficult (i.e. Bed mobility, Transfers, Gait). Score 24 23 22-20 19-15 14-10 9-7 6       Modifier CH CI CJ CK CL CM CN         · Mobility - Walking and Moving Around:               - CURRENT STATUS:    CK - 40%-59% impaired, limited or restricted               - GOAL STATUS:           CJ - 20%-39% impaired, limited or restricted               - D/C STATUS:                       ---------------To be determined---------------  [place] / [place] / Product Type: Medicare /       Medical Necessity:     · Patient is expected to demonstrate progress in strength, balance, coordination, functional technique and endurance to improve safety during functional mobility. Reason for Services/Other Comments:  · Patient continues to require present interventions due to patient's inability to function at baseline level . Use of outcome tool(s) and clinical judgement create a POC that gives a: Clear prediction of patient's progress: LOW COMPLEXITY                 TREATMENT:   (In addition to Assessment/Re-Assessment sessions the following treatments were rendered)   Pre-treatment Symptoms/Complaints:  \"What do I do next? \"  Pain: Initial:   Pain Intensity 1: 2  Pain Location 1: Hip  Pain Orientation 1: Left  Pain Intervention(s) 1: Exercise, Repositioned  Post Session:  2/10      Therapeutic Activity: (    10 min): Therapeutic activities including Chair transfers, bed mobility, and standing/sitting balance with ADLs to improve mobility, strength, balance and activity tolerance. Required minimal Safety awareness training;Verbal cues to promote static and dynamic balance in standing. Gait Training (13 Minutes):  Gait training to improve and/or restore physical functioning as related to mobility, strength, balance and endurance.   Ambulated 125 Feet (ft) with minimal assistance/contact guard assist and minimal verbal cues related to their trunk positionto promote proper body alignment. Instruction in performance of walker managment to correct safety awareness. Assistive Device: Walker, rolling  Ambulation - Level of Assistance: Contact guard assistance  Distance (ft): 125 Feet (ft)  Interventions: Safety awareness training, Verbal cues  Duration: 13 Minutes       Braces/Orthotics/Lines/Etc:   · None  Treatment/Session Assessment:    · Response to Treatment:  Pt had difficulty with bed mobility but has improved mobility with transfers/gait  · Interdisciplinary Collaboration:  · Physical Therapist and Registered Nurse  · After treatment position/precautions:  · Up in chair, Bed alarm/tab alert on, Bed/Chair-wheels locked and Call light within reach  · Compliance with Program/Exercises: Will assess as treatment progresses. · Recommendations/Intent for next treatment session: \"Next visit will focus on advancements to more challenging activities and reduction in assistance provided\".   Total Treatment Duration:  PT Patient Time In/Time Out  Time In: 1437  Time Out: 600 [facility]

## 2017-03-03 NOTE — PROGRESS NOTES
Hospitalist Progress Note    3/3/2017  9:37 AM  Admit Date: 3/1/2017 10:23 AM   NAME: Chuckie Baez   :  1934   MRN:  [id number]   Attending: Denise Lozada MD  PCP:  Sahara Grover MD  Treatment Team: Attending Provider: Denise Lozada MD; Consulting Provider: Hailey Chopra MD; Care Manager: Ryan Marquis RN; Utilization Review: Giorgi Dixon RN; Charge Nurse: Maricruz Gustafson RN  SUBJECTIVE:       Mr. James Thornton is a 79 yo WM with PMH of right renal cell carcinoma s/p cryoablation  with L2 lytic lesion and possible renal tumor recurrence seen on CT AP 10,2016 s/p IR L2 kyphoplasty/biopsy/ ablation 3-1-17 admitted to observation. He has persistent severe pain and lethargy. He additionally has social stressors as his wife is under full time home care for dementia and unwell. He declines rehab. He is much improved today though still with some left hip pain and spasms, at [facility], no BM, denies cough or dyspnea       Past medical/surgical/family/social history reviewed and no changes from  3-2-17,     Recent Results (from the past 24 hour(s))   CBC WITH AUTOMATED DIFF    Collection Time: 17  3:10 PM   Result Value Ref Range    WBC 10.0 4.3 - 11.1 K/uL    RBC 5.36 4.23 - 5.67 M/uL    HGB 15.8 13.6 - 17.2 g/dL    HCT 47.1 41.1 - 50.3 %    MCV 87.9 79.6 - 97.8 FL    MCH 29.5 26.1 - 32.9 PG    MCHC 33.5 31.4 - 35.0 g/dL    RDW 12.9 11.9 - 14.6 %    PLATELET 188 739 - 649 K/uL    MPV 10.2 (L) 10.8 - 14.1 FL    DF AUTOMATED      NEUTROPHILS 85 (H) 43 - 78 %    LYMPHOCYTES 8 (L) 13 - 44 %    MONOCYTES 7 4.0 - 12.0 %    EOSINOPHILS 0 (L) 0.5 - 7.8 %    BASOPHILS 0 0.0 - 2.0 %    IMMATURE GRANULOCYTES 0.2 0.0 - 5.0 %    ABS. NEUTROPHILS 8.6 (H) 1.7 - 8.2 K/UL    ABS. LYMPHOCYTES 0.8 0.5 - 4.6 K/UL    ABS. MONOCYTES 0.7 0.1 - 1.3 K/UL    ABS. EOSINOPHILS 0.0 0.0 - 0.8 K/UL    ABS. BASOPHILS 0.0 0.0 - 0.2 K/UL    ABS. IMM.  GRANS. 0.0 0.0 - 0.5 K/UL   METABOLIC PANEL, BASIC    Collection Time: 03/02/17  3:10 PM   Result Value Ref Range    Sodium 144 136 - 145 mmol/L    Potassium 4.4 3.5 - 5.1 mmol/L    Chloride 106 98 - 107 mmol/L    CO2 27 21 - 32 mmol/L    Anion gap 11 7 - 16 mmol/L    Glucose 120 (H) 65 - 100 mg/dL    BUN 24 (H) 8 - 23 MG/DL    Creatinine 1.52 (H) 0.8 - 1.5 MG/DL    GFR est AA 57 (L) >60 ml/min/1.73m2    GFR est non-AA 47 (L) >60 ml/min/1.73m2    Calcium 9.1 8.3 - 10.4 MG/DL   MAGNESIUM    Collection Time: 03/02/17  3:10 PM   Result Value Ref Range    Magnesium 2.4 1.8 - 2.4 mg/dL   PROTHROMBIN TIME + INR    Collection Time: 03/02/17  7:30 PM   Result Value Ref Range    Prothrombin time 12.6 (H) 9.6 - 12.0 sec    INR 1.2 0.9 - 1.2     PROTHROMBIN TIME + INR    Collection Time: 03/03/17  7:06 AM   Result Value Ref Range    Prothrombin time 12.4 (H) 9.6 - 12.0 sec    INR 1.1 0.9 - 1.2         Allergies   Allergen Reactions    Latex Shortness of Breath    Prednisone Other (comments)     \"I went 'bonkers' after the third round of it\"     Current Facility-Administered Medications   Medication Dose Route Frequency Provider Last Rate Last Dose    amLODIPine (NORVASC) tablet 10 mg  10 mg Oral DAILY Demetria Lennox, MD   10 mg at 03/03/17 0829    docusate sodium (COLACE) capsule 100 mg  100 mg Oral DAILY Lindsay Rosales PA-C   100 mg at 03/03/17 5529    hydrALAZINE (APRESOLINE) 20 mg/mL injection 10 mg  10 mg IntraVENous Q6H PRN Demetria Lennox, MD   10 mg at 03/02/17 2016    HYDROmorphone (PF) (DILAUDID) injection 1 mg  1 mg IntraVENous Q4H PRN Demetria Lennox, MD        [facility]) tablet 400 mg  400 mg Oral TID PRN Demetria Lennox, MD   400 mg at 03/03/17 0829    oxyCODONE IR (ROXICODONE) tablet 5 mg  5 mg Oral Q4H PRN Demetria Lennox, MD   5 mg at 03/02/17 2149    warfarin (COUMADIN) tablet 2.5 mg  2.5 mg Oral Once per day on Sun Tue Wed Thu Sat Demetria Lennox, MD   2.5 mg at 03/02/17 2150    warfarin (COUMADIN) tablet 1.25 mg  1.25 mg Oral Once per day on  Lupe Dunn MD        acetaminophen (TYLENOL) tablet 500 mg  500 mg Oral Q6H PRN Lindsay Rosales PA-C   500 mg at 17 1000    levothyroxine (SYNTHROID) tablet 75 mcg  75 mcg Oral 7am Lindsay Rosales PA-C   75 mcg at 17 0558    metoprolol tartrate (LOPRESSOR) tablet 50 mg  50 mg Oral BID Lindsay Rosales PA-C   50 mg at 17 5743    pravastatin (PRAVACHOL) tablet 40 mg  40 mg Oral QHS Lindsay Rosales PA-C   40 mg at 17 2149    ondansetron (ZOFRAN) injection 4 mg  4 mg IntraVENous Q8H PRN Lindsay Rosales PA-C   4 mg at 17 4488    sodium chloride (NS) flush 5-10 mL  5-10 mL IntraVENous Q8H Yazmin Rehman MD   10 mL at 17 2146    sodium chloride (NS) flush 5-10 mL  5-10 mL IntraVENous PRN Yazmin Rehman MD               Review of Systems as noted above in HPI   PHYSICAL EXAM     Visit Vitals    /79 (BP 1 Location: Right arm, BP Patient Position: At rest)    Pulse 77    Temp 98.7 °F (37.1 °C)    Resp 18    Ht 5' 10\" (1.778 m)    Wt 68 kg (150 lb)    SpO2 93%    BMI 21.52 kg/m2      Temp (24hrs), Av.2 °F (36.8 °C), Min:97.4 °F (36.3 °C), Max:98.7 °F (37.1 °C)    Oxygen Therapy  O2 Sat (%): 93 % (17 0727)  Pulse via Oximetry: 76 beats per minute (17 165)  O2 Device: Room air (17 165)  O2 Flow Rate (L/min): 2 l/min (17 1600)    Intake/Output Summary (Last 24 hours) at 17 2531  Last data filed at 17 2321   Gross per 24 hour   Intake                0 ml   Output              125 ml   Net             -125 ml      General: No acute distress,conversive  Lungs:  CTAB, good effort anterior exam  Heart:  Regular rate and rhythm, no murmur, no edema   Abdomen: Soft, nontender and nondistended, normal bowel sounds  Extremities: Warm and dry         DIAGNOSTIC STUDIES      Labs and Studies from previous 24 hours have been personally reviewed by myself Prior    ASSESSMENT / Titi Mahoney 169 Problems    Diagnosis Date Noted    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[facility] 03/02/2017    Renal cell carcinoma ([facility] 03/02/2017    Chronic anticoagulation 03/02/2017    Hypertension 03/02/2017    Myalgia 03/02/2017    Debility 03/02/2017    Atrial fibrillation ([facility] 11/20/2012         -continue pain management regimen  -add scheduled colace  -xray left hip  -follow INR daily as on coumadin, consulted pharmacy   -titrate [facility]  -outpatient oncology followup  -PT/OT/SW for dispo with [place] soon    DVT Prophylaxis: coumadin/SCD  FEN:oral  Code Status:   PMD:   Care Plan addressed:son         Signed By: Christian Melendez MD     March 3, 2017

## 2017-03-03 NOTE — ANESTHESIA POSTPROCEDURE EVALUATION
Post-Anesthesia Evaluation and Assessment    Patient: Caren Tripp MRN: [id number]  SSN: [id number]    YOB: 1934  Age: 80 y.o. Sex: male       Cardiovascular Function/Vital Signs  Visit Vitals    /83 (BP 1 Location: Right arm, BP Patient Position: At rest)    Pulse 77    Temp 37.1 °C (98.7 °F)    Resp 18    Ht 5' 10\" (1.778 m)    Wt 68 kg (150 lb)    SpO2 93%    BMI 21.52 kg/m2       Patient is status post total IV anesthesia anesthesia for Procedure(s):  RADIO FREQUENCY LUMBAR FOLLOWED BY AUGMENTATION  // L-2 LESION BIOPSY. Nausea/Vomiting: None    Postoperative hydration reviewed and adequate. Pain:  Pain Scale 1: Numeric (0 - 10) (03/03/17 0928)  Pain Intensity 1: 2 (03/03/17 0928)   Managed    Neurological Status:   Neuro (WDL): Within Defined Limits (03/01/17 1610)  Neuro  Neurologic State: Alert (03/03/17 0829)  Orientation Level: Oriented X4 (03/03/17 0829)  Cognition: Follows commands (03/03/17 0829)  LLE Motor Response: Weak (03/03/17 0829)  RLE Motor Response: Weak (03/03/17 0829)   At baseline    Mental Status and Level of Consciousness: Arousable    Pulmonary Status:   O2 Device: Room air (03/01/17 1654)   Adequate oxygenation and airway patent    Complications related to anesthesia: None    Post-anesthesia assessment completed.  No concerns    Signed By: Meagan Pak MD     March 3, 2017

## 2017-03-03 NOTE — PROGRESS NOTES
Problem: Mobility Impaired (Adult and Pediatric)  Goal: *Acute Goals and Plan of Care (Insert Text)  STG:  (1.)Mr. Serafin Shabazz will move from supine to sit and sit to supine , scoot up and down and roll side to side with MINIMAL ASSIST within 3 day(s). LTG:  (1.)Mr. Serafin Shabazz will move from supine to sit and sit to supine , scoot up and down and roll side to side in bed with INDEPENDENT within 7 day(s). (2.)Mr. Seraifn Shabazz will transfer from bed to chair and chair to bed with MODIFIED INDEPENDENCE using the least restrictive device within 7 day(s). (3.)Mr. Serafin Shabazz will ambulate with INDEPENDENT for 450+ feet with the least restrictive device within 7 day(s). (4.)Mr. Serafin Shabazz will negotiate up/down 1 step with R handrail MODIFIED INDEPENDENT within 7 days. ________________________________________________________________________________________________      PHYSICAL THERAPY: Daily Note, Treatment Day: 1st and AM 3/3/2017  OBSERVATION: Hospital Day: 3  [place] / Product Type: Medicare /      NAME/AGE/GENDER: Mague Lei is a 80 y.o. male        PRIMARY DIAGNOSIS: Closed wedge fracture of lumbar vertebra, unspecified lumbar vertebral level, initial encounter ([facility] [S32.000A] Renal cell carcinoma ([facility] [facility] Renal cell carcinoma ([facility] [address]  Procedure(s) (LRB):  RADIO FREQUENCY LUMBAR FOLLOWED BY AUGMENTATION  // L-2 LESION BIOPSY (N/A)  2 Days Post-Op  ICD-10: Treatment Diagnosis:       · Generalized Muscle Weakness (M62.81)  · Difficulty in walking, Not elsewhere classified (R26.2)  · Other abnormalities of gait and mobility (R26.89)  · Low Back Pain (M54.5)   Precaution/Allergies:  Latex and Prednisone       ASSESSMENT:      Mr. Serafin Shabazz presents to physical therapy s/p above procedure. Pt is supine upon arrival and agreeable to PT treatment. C/O 2/10 pain in L hip. /83. Able to sit to EOB with MOD A and MAX verbal cues for log roll technique. Improved sitting balance today due to decreased pain. Able to stand with CGA and demonstrates good static sitting and fair standing balance (RW) with ADLs. Able to ' with RW, C/O dizziness. Hx of dizziness but states it is increased at this time. Required several standing rest breaks, attempting to hold onto wall for support instead of RW. Returned to chair with all needs met. /96. Instructed to sit for 30 min and call for assistance to return to bed. Mr. James Thornton improved this session with decreased pain with mobility and increased AMB distance. Will continue POC. This section established at most recent assessment   PROBLEM LIST (Impairments causing functional limitations):  1. Decreased Strength  2. Decreased ADL/Functional Activities  3. Decreased Transfer Abilities  4. Decreased Ambulation Ability/Technique  5. Decreased Balance  6. Increased Pain  7. Decreased Activity Tolerance    INTERVENTIONS PLANNED: (Benefits and precautions of physical therapy have been discussed with the patient.)  1. Balance Exercise  2. Bed Mobility  3. Family Education  4. Gait Training  5. Therapeutic Activites  6. Therapeutic Exercise/Strengthening  7. Transfer Training  8. Group Therapy      TREATMENT PLAN: Frequency/Duration: daily for duration of hospital stay  Rehabilitation Potential For Stated Goals: GOOD      RECOMMENDED REHABILITATION/EQUIPMENT: (at time of discharge pending progress): Continue Skilled Therapy, Home Health: Physical Therapy and Adaptive Equipment: Walkers, Type: Rolling Walker. HISTORY:   History of Present Injury/Illness (Reason for Referral):  Per H&P, \"The patient is a 80 y.o. male who presents for L2 biopsy, RF ablation of a L2 lesion. Back and left buttock pain. Hx renal cell carcinoma treated with cryoablation 6/2015\"  Past Medical History/Comorbidities:   Mr. James Thornton  has a past medical history of AAA (abdominal aortic aneurysm) ([facility] [address] (11/20/2012); Allergic rhinitis, cause unspecified (11/20/2012); Aneurysm ([address]  Atrial fibrillation ([facility] [address] (11/20/2012); Cancer ([facility] [address] Chest pain (11/9/2015); Erectile dysfunction (11/9/2015); HTN (hypertension) (2/6/14); Hypertrophy of prostate with urinary obstruction and other lower urinary tract symptoms (LUTS); Impotence of organic origin; Kidney stones (11/20/2012); Malaise (11/9/2015); Other vitamin B12 deficiency anemia (11/20/2012); Poor historian; Right kidney mass; Unspecified adverse effect of anesthesia; Unspecified disorder of kidney and ureter; Unspecified hypothyroidism (11/20/2012); and Vertigo. He also has no past medical history of Coagulation disorder [facility] Difficult intubation; Malignant hyperthermia due to anesthesia; or Pseudocholinesterase deficiency. Mr. Leigh Keating  has a past surgical history that includes vascular surgery procedure unlist (4/2008); other surgical; other surgical; hernia repair (Bilateral, 2000?); aaa repair (2012); urological; knee replacement (06/08/2012); orthopaedic (2012); and tonsillectomy (age 6). Social History/Living Environment:   Home Environment: Private residence  # Steps to Enter: 1  Rails to Enter: Yes  Hand Rails : Right  One/Two Story Residence: One story  Living Alone: No  Support Systems: Child(anali)  Patient Expects to be Discharged to[de-identified] Private residence  Current DME Used/Available at Home: Bartolome Jorge, straight, Shower chair  Prior Level of Function/Work/Activity:  Mr. Leigh Keating is independent at baseline, uses cane, and drives. Provides care for wife but she is mostly assisted by caregivers present 12 hours/day. Number of Personal Factors/Comorbidities that affect the Plan of Care:  Caregiver for wife  Spinal precautions  Age 3+: HIGH COMPLEXITY   EXAMINATION:   Most Recent Physical Functioning:   Gross Assessment:  AROM: Generally decreased, functional  Strength: Generally decreased, functional  Sensation: Intact               Posture:  Posture (WDL): Exceptions to WDL  Posture Assessment:  Forward head, Rounded shoulders, Trunk flexion  Balance:  Sitting: Intact  Sitting - Static: Good (unsupported)  Sitting - Dynamic: Good (unsupported)  Standing: Impaired  Standing - Static: Constant support; Fair  Standing - Dynamic : Fair Bed Mobility:  Supine to Sit: Moderate assistance  Scooting: Supervision  Wheelchair Mobility:     Transfers:  Sit to Stand: Contact guard assistance  Stand to Sit: Stand-by asssistance  Bed to Chair: Contact guard assistance  Gait:     Base of Support: Widened  Speed/Luz: Slow;Pace decreased (<100 feet/min)  Step Length: Right shortened;Left shortened  Distance (ft): 100 Feet (ft)  Assistive Device: Walker, rolling  Ambulation - Level of Assistance: Contact guard assistance  Interventions: Safety awareness training;Verbal cues; Visual/Demos  Duration: 15 Minutes       Body Structures Involved:  1. Bones  2. Joints  3. Muscles Body Functions Affected:  1. Sensory/Pain  2. Neuromusculoskeletal  3. Movement Related  4. Skin Related Activities and Participation Affected:  1. General Tasks and Demands  2. Mobility  3. Self Care  4. Domestic Life  5. Community, Social and [place]   Number of elements that affect the Plan of Care: 4+: HIGH COMPLEXITY   CLINICAL PRESENTATION:   Presentation: Stable and uncomplicated: LOW COMPLEXITY   CLINICAL DECISION MAKIN [facility] Inpatient Short Form  How much difficulty does the patient currently have. .. Unable A Lot A Little None   1. Turning over in bed (including adjusting bedclothes, sheets and blankets)? [ ] 1   [X] 2   [ ] 3   [ ] 4   2. Sitting down on and standing up from a chair with arms ( e.g., wheelchair, bedside commode, etc.)   [ ] 1   [ ] 2   [ ] 3   [X] 4   3. Moving from lying on back to sitting on the side of the bed? [ ] 1   [X] 2   [ ] 3   [ ] 4   How much help from another person does the patient currently need. .. Total A Lot A Little None   4. Moving to and from a bed to a chair (including a wheelchair)? [ ] 1   [ ] 2   [ ] 3   [X] 4   5. Need to walk in hospital room? [ ] 1   [ ] 2   [ ] 3   [X] 4   6. Climbing 3-5 steps with a railing? [X] 1   [ ] 2   [ ] 3   [ ] 4   © 2007, Trustees of [address] [place] [address] [address] [address], under license to [employer]. All rights reserved    Score:  Initial: 17 Most Recent: X (Date: 3/2/17 )     Interpretation of Tool:  Represents activities that are increasingly more difficult (i.e. Bed mobility, Transfers, Gait). Score 24 23 22-20 19-15 14-10 9-7 6       Modifier CH CI CJ CK CL CM CN         · Mobility - Walking and Moving Around:               - CURRENT STATUS:    CK - 40%-59% impaired, limited or restricted               - GOAL STATUS:           CJ - 20%-39% impaired, limited or restricted               - D/C STATUS:                       ---------------To be determined---------------  [place] / Product Type: Medicare /       Medical Necessity:     · Patient is expected to demonstrate progress in strength, balance, coordination, functional technique and endurance to improve safety during functional mobility. Reason for Services/Other Comments:  · Patient continues to require present interventions due to patient's inability to function at baseline level . Use of outcome tool(s) and clinical judgement create a POC that gives a: Clear prediction of patient's progress: LOW COMPLEXITY                 TREATMENT:   (In addition to Assessment/Re-Assessment sessions the following treatments were rendered)   Pre-treatment Symptoms/Complaints:  \"I don't remember the log roll\"  Pain: Initial:   Pain Intensity 1: 2  Pain Location 1: Hip  Pain Orientation 1: Left  Pain Intervention(s) 1: Exercise, Repositioned  Post Session:  2/10      Therapeutic Activity: (    23 min):   Therapeutic activities including Chair transfers, Toilet transfers, and standing/sitting balance with ADLs to improve mobility, strength, balance and activity tolerance. Required minimal Safety awareness training;Verbal cues; Visual/Demos to promote static and dynamic balance in standing. Gait Training (15 Minutes):  Gait training to improve and/or restore physical functioning as related to mobility, strength, balance and endurance. Ambulated 100 Feet (ft) with minimal assistance/contact guard assist and minimal verbal cues related to their trunk positionto promote proper body alignment. Instruction in performance of walker managment to correct safety awareness. Assistive Device: Walker, rolling  Ambulation - Level of Assistance: Contact guard assistance  Distance (ft): 100 Feet (ft)  Interventions: Safety awareness training, Verbal cues, Visual/Demos  Duration: 15 Minutes       Braces/Orthotics/Lines/Etc:   · None  Treatment/Session Assessment:    · Response to Treatment:  Pt had difficulty with bed mobility but has improved mobility with transfers/gait  · Interdisciplinary Collaboration:  · Physical Therapist and Registered Nurse  · After treatment position/precautions:  · Up in chair, Bed alarm/tab alert on, Bed/Chair-wheels locked and Call light within reach  · Compliance with Program/Exercises: Will assess as treatment progresses. · Recommendations/Intent for next treatment session: \"Next visit will focus on advancements to more challenging activities and reduction in assistance provided\".   Total Treatment Duration:  PT Patient Time In/Time Out  Time In: 0928  Time Out: Satish

## 2017-03-03 NOTE — PROGRESS NOTES
Warfarin dosing per pharmacist    Keila Fernández is a 80 y.o. male. Height: 5' 10\" (177.8 cm)    Weight: 68 kg (150 lb)    Indication:  Afib    Goal INR:  2-3    Home dose:  2.5 mg daily except 1.25 mg on Monday and Friday per care everywhere coumadin clinic note through [facility] cardiology on 2/21/17    Risk factors or significant drug interactions:  N/A    Other anticoagulants:  N/A    Daily Monitoring  Date  INR     Warfarin dose HGB              Notes  3/2  1.2  2.5 mg  15.8    3/3  1.1    1.25 mg + 2mgX1 ----    INR down to 112 after being held since 2/24. For now, will resume patient's regular home dose of 2.5 mg daily except 1.25 mg on Monday and Friday as the patient has recently been therapeutic on this dosing regimen per outpatient anticoag clinic notes. Warfarin 2 mg once today. Moving forward, will check INR daily.     Thank you,  Braydon Maloney, PharmD, [facility]  Clinical Pharmacist  [phone]

## 2017-03-04 NOTE — PROGRESS NOTES
Problem: Self Care Deficits Care Plan (Adult)  Goal: *Acute Goals and Plan of Care (Insert Text)  1. Patient will complete lower body bathing and dressing with setup and adaptive equipment as needed. 2. Patient will complete toileting with supervision. 3. Patient will tolerate 20 minutes of OT treatment with no rest breaks to increase activity tolerance for ADLs. 4. Patient will complete functional transfers with supervision and adaptive equipment as needed. Timeframe: 7 visits       OCCUPATIONAL THERAPY: Initial Assessment 3/4/2017  INPATIENT: Hospital Day: 4  [place] / Product Type: Medicare /      NAME/AGE/GENDER: Gina Wise is a 80 y.o. male        PRIMARY DIAGNOSIS:  Closed wedge fracture of lumbar vertebra, unspecified lumbar vertebral level, initial encounter ([facility] [address] [S32.000A] Renal cell carcinoma ([facility] [address] Renal cell carcinoma ([facility] [address]  Procedure(s) (LRB):  RADIO FREQUENCY LUMBAR FOLLOWED BY AUGMENTATION  // L-2 LESION BIOPSY (N/A)  3 Days Post-Op  ICD-10: Treatment Diagnosis:        · Low Back Pain (M54.5)   Precautions/Allergies:         Latex and Prednisone       ASSESSMENT:      Mr. Leigh Keating is an 80year old male who is now s/p above procedure. Patient lives with wife who has dementia and he is her caregiver. He is typically independent with his own ADLs, meal prep, laundry, and driving. Patient supine in bed upon arrival, agreeable to OT evaluation. Reports no pain. Able to complete bed mobility with minimal assistance, don shoes with moderate assistance, stand with CGA, and take steps to chair with CGA and rolling walker. He needs minimal cues for safety. Demonstrates ability to feed self with setup. Patient is currently functioning below his baseline for ADLs and would benefit from continued OT to increase independence and safety. Will follow.        This section established at most recent assessment   PROBLEM LIST (Impairments causing functional limitations):  1. Decreased ADL/Functional Activities  2. Decreased Transfer Abilities  3. Decreased Ambulation Ability/Technique  4. Decreased Balance  5. Decreased Activity Tolerance  6. Decreased Flexibility/Joint Mobility  7. Decreased Knowledge of Precautions    INTERVENTIONS PLANNED: (Benefits and precautions of occupational therapy have been discussed with the patient.)  1. Activities of daily living training  2. Adaptive equipment training  3. Group therapy  4. Theraputic activity  5. Theraputic exercise      TREATMENT PLAN: Frequency/Duration: Follow patient 3x/ week to address above goals. Rehabilitation Potential For Stated Goals: GOOD      RECOMMENDED REHABILITATION/EQUIPMENT: (at time of discharge pending progress): Continue Skilled Therapy. OCCUPATIONAL PROFILE AND HISTORY:   History of Present Injury/Illness (Reason for Referral):  PEr H&P, \"The patient is a 80 y.o. male who presents for L2 biopsy, RF ablation of a L2 lesion. Back and left buttock pain. Hx renal cell carcinoma treated with cryoablation 6/2015. NPO x meds. Coumadin held. \"  Past Medical History/Comorbidities:   Mr. Leigh Keating  has a past medical history of AAA (abdominal aortic aneurysm) ([address] (11/20/2012); Allergic rhinitis, cause unspecified (11/20/2012); Aneurysm ([address] Atrial fibrillation ([address] (11/20/2012); Cancer ([address] Chest pain (11/9/2015); Erectile dysfunction (11/9/2015); HTN (hypertension) (2/6/14); Hypertrophy of prostate with urinary obstruction and other lower urinary tract symptoms (LUTS); Impotence of organic origin; Kidney stones (11/20/2012); Malaise (11/9/2015); Other vitamin B12 deficiency anemia (11/20/2012); Poor historian; Right kidney mass; Unspecified adverse effect of anesthesia; Unspecified disorder of kidney and ureter; Unspecified hypothyroidism (11/20/2012); and Vertigo. He also has no past medical history of Coagulation disorder ([address]  Difficult intubation; Malignant hyperthermia due to anesthesia; or Pseudocholinesterase deficiency. Mr. Cuate Silveira  has a past surgical history that includes vascular surgery procedure unlist (4/2008); other surgical; other surgical; hernia repair (Bilateral, 2000?); aaa repair (2012); urological; knee replacement (06/08/2012); orthopaedic (2012); and tonsillectomy (age 6). Social History/Living Environment:   Home Environment: Private residence  # Steps to Enter: 1  Rails to Enter: Yes  Hand Rails : Right  One/Two Story Residence: One story  Living Alone: No  Support Systems: Child(anali)  Patient Expects to be Discharged to[de-identified] Private residence  Current DME Used/Available at Home: Wong Boys, straight, Shower chair  Prior Level of Function/Work/Activity:  Patient lives with his wife. SHe has dementia and he is her caregiver. Patient is independent with all ADLs, meal prep, laundry, driving, etc.       Number of Personal Factors/Comorbidities that affect the Plan of Care: Brief history (0):  LOW COMPLEXITY   ASSESSMENT OF OCCUPATIONAL PERFORMANCE[de-identified]   Activities of Daily Living:         Basic ADLs (From Assessment) Complex ADLs (From Assessment)   Basic ADL  Feeding: Setup  Oral Facial Hygiene/Grooming: Setup  Bathing: Minimum assistance  Upper Body Dressing: Setup  Lower Body Dressing: Moderate assistance  Toileting: Minimum assistance Instrumental ADL  Meal Preparation: Maximum assistance  Homemaking: Maximum assistance   Grooming/Bathing/Dressing Activities of Daily Living     Cognitive Retraining  Safety/Judgement: Awareness of environment; Fall prevention     Feeding  Drink to Mouth: Supervision/set-up               Lower Body Dressing Assistance  Dressing Assistance: Minimum assistance  Shoes with Cloth Laces:  Moderate assistance  Position Performed: Seated edge of bed Bed/Mat Mobility  Rolling: Stand-by asssistance  Supine to Sit: Minimum assistance  Sit to Stand: Contact guard assistance  Bed to Chair: Contact guard assistance  Scooting: Supervision          Most Recent Physical Functioning:   Gross Assessment:  AROM: Within functional limits (BUE)  Strength: Within functional limits (BUE)               Posture:  Posture (WDL): Exceptions to WDL  Posture Assessment: Forward head, Rounded shoulders  Balance:  Sitting: Intact  Standing: Impaired  Standing - Static: Fair (+)  Standing - Dynamic : Fair Bed Mobility:  Rolling: Stand-by asssistance  Supine to Sit: Minimum assistance  Scooting: Supervision  Wheelchair Mobility:     Transfers:  Sit to Stand: Contact guard assistance  Stand to Sit: Contact guard assistance  Bed to Chair: Contact guard assistance                    Patient Vitals for the past 6 hrs:       BP BP Patient Position SpO2 Pulse   17 1114 (!) 171/92 At rest 99 % 67        Mental Status  Neurologic State: Alert  Orientation Level: Oriented to person, Oriented to place, Oriented to situation  Cognition: Follows commands  Perception: Appears intact  Perseveration: No perseveration noted  Safety/Judgement: Awareness of environment, Fall prevention                               Physical Skills Involved:  1. Range of Motion  2. Balance  3. Mobility  4. Strength Cognitive Skills Affected (resulting in the inability to perform in a timely and safe manner):  1. safety awareness Psychosocial Skills Affected:  1. Environmental Adaptations   Number of elements that affect the Plan of Care: 5+:  HIGH COMPLEXITY   CLINICAL DECISION MAKIN [facility] Mobility Inpatient Short Form  How much help from another person does the patient currently need. .. Total A Lot A Little None   1. Putting on and taking off regular lower body clothing?   [ ] 1   [X] 2   [ ] 3   [ ] 4   2. Bathing (including washing, rinsing, drying)? [ ] 1   [X] 2   [ ] 3   [ ] 4   3. Toileting, which includes using toilet, bedpan or urinal?   [ ] 1   [ ] 2   [X] 3   [ ] 4   4. Putting on and taking off regular upper body clothing?   [ ] 1   [ ] 2   [X] 3   [ ] 4   5. Taking care of personal grooming such as brushing teeth? [ ] 1   [ ] 2   [X] 3   [ ] 4   6. Eating meals? [ ] 1   [ ] 2   [ ] 3   [X] 4   © 2007, Trustees of [address] [place] [address] [address] [address], under license to [employer]. All rights reserved    Score:  Initial: 17 Most Recent: X (Date: -- )     Interpretation of Tool:  Represents activities that are increasingly more difficult (i.e. Bed mobility, Transfers, Gait). Score 24 23 22-20 19-15 14-10 9-7 6       Modifier CH CI CJ CK CL CM CN         · Self Care:               - CURRENT STATUS:    CK - 40%-59% impaired, limited or restricted               - GOAL STATUS:           CJ - 20%-39% impaired, limited or restricted               - D/C STATUS:                       ---------------To be determined---------------  [place] / Product Type: Medicare /       Medical Necessity:     · Patient demonstrates good rehab potential due to higher previous functional level. Reason for Services/Other Comments:  · Patient continues to require present interventions due to patient's inability to care for self at independent level.    Use of outcome tool(s) and clinical judgement create a POC that gives a: MODERATE COMPLEXITY             TREATMENT:   (In addition to Assessment/Re-Assessment sessions the following treatments were rendered)      Pre-treatment Symptoms/Complaints:  None   Pain: Initial:   Pain Intensity 1: 0  Post Session:  None       Assessment/Reassessment only, no treatment provided today     Braces/Orthotics/Lines/Etc:   · O2 Device: Room air  Treatment/Session Assessment:    · Response to Treatment:  Tolerated welll with no complaints  · Interdisciplinary Collaboration:  · Occupational Therapist  · Registered Nurse  · After treatment position/precautions:  · Up in chair  · Bed alarm/tab alert on  · Bed/Chair-wheels locked  · Bed in low position  · Call light within reach  · RN notified  · Compliance with Program/Exercises: compliant all of the time. · Recommendations/Intent for next treatment session: \"Next visit will focus on advancements to more challenging activities and reduction in assistance provided\".   Total Treatment Duration:  OT Patient Time In/Time Out  Time In: 0842  Time Out: 600 [address] Дмитрий OTR/L

## 2017-03-04 NOTE — DISCHARGE SUMMARY
Physician Discharge Summary     Patient: Oskar Cervantes MRN: [id number]  SSN: [id number]    YOB: 1934  Age: 80 y.o.   Sex: male       Admit Date: 3/1/2017    Discharge Date: 3/4/2017    Admission Diagnoses: Closed wedge fracture of lumbar vertebra, unspecified lumbar vertebral level, initial encounter ([place] [id number].) [S32.000A]    Discharge Diagnoses:   Problem List as of 3/4/2017  Date Reviewed: 2/21/2017          Codes Class Noted - Resolved    S/P vertebroplasty ICD-10-CM: Z98.890  ICD-9-CM: V45.89  3/3/2017 - Present        Metastasis to bone [facility]) ICD-10-CM: C79.51  ICD-9-CM: 198.5  3/2/2017 - Present        * (Principal)Renal cell carcinoma ([facility] ICD-10-CM: C64.9  ICD-9-CM: 189.0  3/2/2017 - Present        Chronic anticoagulation (Chronic) ICD-10-CM: Z79.01  ICD-9-CM: V58.61  3/2/2017 - Present        Hypertension (Chronic) ICD-10-CM: I10  ICD-9-CM: 401.9  3/2/2017 - Present        Myalgia ICD-10-CM: M79.1  ICD-9-CM: 729.1  3/2/2017 - Present        Debility (Chronic) ICD-10-CM: R53.81  ICD-9-CM: 799.3  3/2/2017 - Present        Erectile dysfunction ICD-10-CM: N52.9  ICD-9-CM: 607.84  11/9/2015 - Present        Chest pain ICD-10-CM: R07.9  ICD-9-CM: 786.50  11/9/2015 - Present        Malaise ICD-10-CM: R53.81  ICD-9-CM: 780.79  11/9/2015 - Present        Acute renal injury ([facility] ICD-10-CM: N17.9  ICD-9-CM: 584.9  6/11/2015 - Present        YUVAL (acute kidney injury) ([facility] ICD-10-CM: N17.9  ICD-9-CM: 584.9  6/9/2015 - Present        Nausea and vomiting ICD-10-CM: R11.2  ICD-9-CM: 787.01  6/8/2015 - Present        Uncontrolled hypertension ICD-10-CM: I10  ICD-9-CM: 401.9  6/8/2015 - Present        Mixed hyperlipidemia ICD-10-CM: E78.2  ICD-9-CM: 272.2  2/24/2014 - Present        Unspecified hypothyroidism ICD-10-CM: E03.9  ICD-9-CM: 244.9  11/20/2012 - Present        Allergic rhinitis ICD-10-CM: J30.9  ICD-9-CM: 477.9  11/20/2012 - Present        Other vitamin B12 deficiency anemia ICD-10-CM: D51.8  ICD-9-CM: 281.1  11/20/2012 - Present        Atrial fibrillation ([facility] ICD-10-CM: I48.91  ICD-9-CM: 427.31  11/20/2012 - Present        AAA (abdominal aortic aneurysm) ([facility] ICD-10-CM: I71.4  ICD-9-CM: 441.4  11/20/2012 - Present        Kidney stones ICD-10-CM: N20.0  ICD-9-CM: 592.0  11/20/2012 - Present        HTN (hypertension) ICD-10-CM: I10  ICD-9-CM: 401.9  11/20/2012 - Present               Discharge Condition: Stable    Hospital Course: Mr. Dione Loyd is a 79 yo WM with PMH of right renal cell carcinoma s/p cryoablation 2015 with L2 lytic lesion and possible renal tumor recurrence seen on CT AP 10,2016 s/p IR L2 kyphoplasty/biopsy/ ablation 3-1-17 admitted to observation. He has persistent severe pain and lethargy. He additionally has social stressors as his wife is under full time home care for dementia and unwell. He declines rehab. He was managed for his pain and had PT with plans for home with PT/home health. Xray left hip negative. He needs INR check in 3 days for chronic coumadin for afib. He will need oncology followup as well.      Primary Care Physician:  Génesis Leblanc MD     Consults: IR    Significant Diagnostic Studies:  HISTORY: Metastatic renal cell carcinoma to the L2 vertebral body with lytic  lesion.     Following office evaluation, review of findings with the patient and discussion  regarding treatment options, which were planned to include percutaneous  radiofrequency ablation and bone cement vertebroplasty followed by external  radiation by the radiation oncology service, at which time the patient  understood the indications, alternatives and potential complications including  but not limited to risk of injury of adjacent structures, extravasation of bone  cement, infection, bleeding, pain, etc. at which time he understood and agreed  to the procedure, the patient was sedated by the anesthesia service and placed  prone on the angiography table.     Coned beam CT imaging was performed over the L2 vertebral body.     Fluoroscopy was performed to localize the bilateral L2 pedicles, after the skin  was prepped and draped using maximum sterile barrier technique. 2 skin sites  were marked for transpedicular access into both pedicles. The skin and deep  tissues were liberally infiltrated with 2% lidocaine. The periosteum was  infiltrated with 0.5% bupivacaine. Then, using the Kyphon set, bilateral  cannulas with trochars were advanced into the vertebral body through by  pedicular approach, utilizing AP and lateral fluoroscopic guidance. Core biopsy  of the mass was performed and submitted in formalin.     Then, using the [employer] Osteocool radiofrequency device, 15 minute treatment at  70 degrees centigrade was performed using bi-pedicular radiofrequency probes. These produce were then removed and radiopaque bone cement (polymethyl  methacrylate) was slowly injected into the vertebral body under fluoroscopic  guidance. Then, after several minutes, the cannulas were removed. Skin was  cleansed and Dermabond used to close small bilateral transverse dermatotomy's.     The patient tolerated procedure well without complication.     Total fluoroscopy time for this procedure was 6 minutes 30 seconds.     FINDINGS: Lytic mass is demonstrated in the upper half of the L2 vertebral body  adjacent to the superior endplate and anterior and posterior vertebral margins. There is some osseous destruction of the posterior vertebral margin with minimal  extension of tumor into the ventral spinal canal. Mass measures up to 2.8 x 3.4  x 2.2 cm and there is pathologic fracture of the superior vertebral endplate. The OsteoCool probes were demonstrated to be in excellent location. These findings have progressed compared with MRI of the spine dated 12/13/2016. Excellent bone cement deposition is seen centrally within the tumor mass.     Degenerative changes and osteoporosis are seen in the vertebrae.  Mild central  compression deformities of the L2 vertebral body are evident.     IMPRESSION  IMPRESSION: Technically successful radiofrequency ablation and vertebroplasty of  the L2 vertebral body. Biopsy of mass was performed in histopathology is  pending.     PLAN: Follow-up with radiation oncology for external beam treatment.     IMPRESSION:  1. Metastatic renal cell carcinoma to the L2 vertebral body with pathologic  fracture and lytic bony destruction. Mild extension of tumor through the  posterior cortex. 2. Technically successful fluoroscopic guided biopsy, radiofrequency ablation,  and vertebroplasty. Left hip 3 views dated 3/3/2017     CLINICAL INFORMATION: Severe pain. History of renal cancer     3 views show no fracture, dislocation or destructive process. No narrowing of  the left hip joint. There is degenerative change lower lumbar spine.           IMPRESSION  IMPRESSION: No acute bony abnormality                               Discharge Exam:  Visit Vitals    /87 (BP 1 Location: Right arm, BP Patient Position: At rest)    Pulse (!) 102    Temp 97.9 °F (36.6 °C)    Resp 17    Ht 5' 10\" (1.778 m)    Wt 68 kg (150 lb)    SpO2 97%    BMI 21.52 kg/m2     General: No acute distress,conversive  Lungs:  CTAB, good effort   Heart:  Regular rate and rhythm, no murmur, no edema   Abdomen: Soft, nontender and nondistended, normal bowel sounds  Extremities: Warm and dry     Disposition: Home    Discharge Medications:   Current Discharge Medication List      START taking these medications    Details   docusate sodium (COLACE) 100 mg capsule Take 1 Cap by mouth two (2) times a day for 90 days. Qty: 60 Cap, Refills: 0      metaxalone (SKELAXIN) 400 mg tablet Take 1 Tab by mouth three (3) times daily as needed. Qty: 30 Tab, Refills: 0      oxyCODONE IR (ROXICODONE) 5 mg immediate release tablet Take 1 Tab by mouth every four (4) hours as needed.  Max Daily Amount: 30 mg.  Qty: 30 Tab, Refills: 0 CONTINUE these medications which have CHANGED    Details   amLODIPine (NORVASC) 10 mg tablet Take 1 Tab by mouth daily. Qty: 90 Tab, Refills: 0         CONTINUE these medications which have NOT CHANGED    Details   pravastatin (PRAVACHOL) 40 mg tablet Take 40 mg by mouth nightly. metoprolol tartrate (LOPRESSOR) 50 mg tablet TAKE 1/2 TABLET BY MOUTH EVERY 12 HOURS  Qty: 90 Tab, Refills: 0    Associated Diagnoses: Essential hypertension      melatonin 3 mg tablet Take  by mouth nightly. Associated Diagnoses: Renal cell cancer, unspecified laterality ([address] Adenocarcinoma, renal cell, unspecified laterality (HCC)      levothyroxine (SYNTHROID) 75 mcg tablet TAKE 1 TABLET BY MOUTH DAILY BEFORE BREAKFAST  Qty: 90 Tab, Refills: 1    Associated Diagnoses: Acquired hypothyroidism      fluocinoNIDE (LIDEX) 0.05 % topical cream Apply  to affected area two (2) times a day. Qty: 60 g, Refills: 1    Associated Diagnoses: Rash      warfarin (COUMADIN) 2.5 mg tablet 1/2 to 1 tablet qhs or as directed  Indications: PREVENT THROMBOEMBOLISM IN CHRONIC ATRIAL FIBRILLATION  Qty: 90 Tab, Refills: 3      fluticasone (FLONASE) 50 mcg/actuation nasal spray 2 Sprays by Both Nostrils route as needed. acetaminophen (TYLENOL) 500 mg tablet Take  by mouth every six (6) hours as needed for Pain. Activity: PT/OT per Home Health    Diet: Cardiac Diet    Wound Care: None needed    Follow-up Appointments   Procedures    FOLLOW UP VISIT Appointment in: One Week Needs to have INR checked Monday with Levine, Susan. \Hospital Has a New Name and Outlook.\"" cardiology, needs to see oncology as scheduled, thanks     Needs to have INR checked Monday with Levine, Susan. \Hospital Has a New Name and Outlook.\"" cardiology, needs to see oncology as scheduled, thanks     Standing Status:   Standing     Number of Occurrences:   1     Order Specific Question:   Appointment in     Answer:    One Week        Time to discharge:30 minutes    Signed By: Lalo Fuller MD     March 4, 2017

## 2017-03-04 NOTE — PROGRESS NOTES
Problem: Mobility Impaired (Adult and Pediatric)  Goal: *Acute Goals and Plan of Care (Insert Text)  STG:  (1.)Mr. Connie Haro will move from supine to sit and sit to supine , scoot up and down and roll side to side with MINIMAL ASSIST within 3 day(s). LTG:  (1.)Mr. Connie Haro will move from supine to sit and sit to supine , scoot up and down and roll side to side in bed with INDEPENDENT within 7 day(s). (2.)Mr. Connie Haro will transfer from bed to chair and chair to bed with MODIFIED INDEPENDENCE using the least restrictive device within 7 day(s). (3.)Mr. Connie Haro will ambulate with INDEPENDENT for 450+ feet with the least restrictive device within 7 day(s). (4.)Mr. Connie Haro will negotiate up/down 1 step with R handrail MODIFIED INDEPENDENT within 7 days. ________________________________________________________________________________________________      PHYSICAL THERAPY: Daily Note, Treatment Day: 2nd and PM 3/4/2017  INPATIENT: Hospital Day: 4  [place] / Product Type: Medicare /      NAME/AGE/GENDER: Blaine Quintanilla is a 80 y.o. male        PRIMARY DIAGNOSIS: Closed wedge fracture of lumbar vertebra, unspecified lumbar vertebral level, initial encounter ([facility] [S32.000A] Renal cell carcinoma (HCC) Renal cell carcinoma ([facility]  Procedure(s) (LRB):  RADIO FREQUENCY LUMBAR FOLLOWED BY AUGMENTATION  // L-2 LESION BIOPSY (N/A)  3 Days Post-Op  ICD-10: Treatment Diagnosis:       · Generalized Muscle Weakness (M62.81)  · Difficulty in walking, Not elsewhere classified (R26.2)  · Other abnormalities of gait and mobility (R26.89)  · Low Back Pain (M54.5)   Precaution/Allergies:  Latex and Prednisone       ASSESSMENT:      Mr. Connie Haro presents to physical therapy s/p above procedure. Pt is supine upon arrival and agreeable to PT treatment. C/O a pinching pain in L hip. He requires MIN verbal cues for log roll technique and sits to EOB with MIN A.  Stands and slowly ' with RW and CGA/SBA requiring several standing rest breaks. Amb into BR upon return to room. Instructed to call nursing when he was ready to get up. Sister arrived and was in room with pt and RN was notified that pt was up in [facility]. Making steady progress toward goals. Will continue POC. This section established at most recent assessment   PROBLEM LIST (Impairments causing functional limitations):  1. Decreased Strength  2. Decreased ADL/Functional Activities  3. Decreased Transfer Abilities  4. Decreased Ambulation Ability/Technique  5. Decreased Balance  6. Increased Pain  7. Decreased Activity Tolerance    INTERVENTIONS PLANNED: (Benefits and precautions of physical therapy have been discussed with the patient.)  1. Balance Exercise  2. Bed Mobility  3. Family Education  4. Gait Training  5. Therapeutic Activites  6. Therapeutic Exercise/Strengthening  7. Transfer Training  8. Group Therapy      TREATMENT PLAN: Frequency/Duration: daily for duration of hospital stay  Rehabilitation Potential For Stated Goals: GOOD      RECOMMENDED REHABILITATION/EQUIPMENT: (at time of discharge pending progress): Continue Skilled Therapy, Home Health: Physical Therapy and Adaptive Equipment: Walkers, Type: Rolling Walker. HISTORY:   History of Present Injury/Illness (Reason for Referral):  Per H&P, \"The patient is a 80 y.o. male who presents for L2 biopsy, RF ablation of a L2 lesion. Back and left buttock pain. Hx renal cell carcinoma treated with cryoablation 6/2015\"  Past Medical History/Comorbidities:   Mr. Serafin Shabazz  has a past medical history of AAA (abdominal aortic aneurysm) ([address] (11/20/2012); Allergic rhinitis, cause unspecified (11/20/2012); Aneurysm ([address] Atrial fibrillation ([address] (11/20/2012); Cancer ([address] Chest pain (11/9/2015); Erectile dysfunction (11/9/2015); HTN (hypertension) (2/6/14); Hypertrophy of prostate with urinary obstruction and other lower urinary tract symptoms (LUTS);  Impotence of organic origin; Kidney stones (11/20/2012); Malaise (11/9/2015); Other vitamin B12 deficiency anemia (11/20/2012); Poor historian; Right kidney mass; Unspecified adverse effect of anesthesia; Unspecified disorder of kidney and ureter; Unspecified hypothyroidism (11/20/2012); and Vertigo. He also has no past medical history of Coagulation disorder ([address] Difficult intubation; Malignant hyperthermia due to anesthesia; or Pseudocholinesterase deficiency. Mr. Christiano Springer  has a past surgical history that includes vascular surgery procedure unlist (4/2008); other surgical; other surgical; hernia repair (Bilateral, 2000?); aaa repair (2012); urological; knee replacement (06/08/2012); orthopaedic (2012); and tonsillectomy (age 6). Social History/Living Environment:   Home Environment: Private residence  # Steps to Enter: 1  Rails to Enter: Yes  Hand Rails : Right  One/Two Story Residence: One story  Living Alone: No  Support Systems: Child(anali)  Patient Expects to be Discharged to[de-identified] Private residence  Current DME Used/Available at Home: Sweta Dueshekhar, straight, Shower chair  Prior Level of Function/Work/Activity:  Mr. Christiano Springer is independent at baseline, uses cane, and drives. Provides care for wife but she is mostly assisted by caregivers present 12 hours/day. Number of Personal Factors/Comorbidities that affect the Plan of Care:  Caregiver for wife  Spinal precautions  Age 3+: HIGH COMPLEXITY   EXAMINATION:   Most Recent Physical Functioning:   Gross Assessment:                  Posture:  Posture (WDL): Exceptions to WDL  Posture Assessment: Forward head, Rounded shoulders  Balance:  Sitting: Intact  Standing: Impaired  Standing - Static: Fair (+)  Standing - Dynamic : Fair Bed Mobility:  Rolling: Stand-by asssistance  Supine to Sit: Minimum assistance  Wheelchair Mobility:     Transfers:  Sit to Stand: Contact guard assistance  Stand to Sit: Contact guard assistance  Gait:     Base of Support: Widened  Speed/Luz: Pace decreased (<100 feet/min); Slow  Step Length: Left shortened;Right shortened  Distance (ft): 200 Feet (ft)  Assistive Device: Walker, rolling  Ambulation - Level of Assistance: Stand-by asssistance;Contact guard assistance  Interventions: Manual cues; Safety awareness training;Verbal cues       Body Structures Involved:  1. Bones  2. Joints  3. Muscles Body Functions Affected:  1. Sensory/Pain  2. Neuromusculoskeletal  3. Movement Related  4. Skin Related Activities and Participation Affected:  1. General Tasks and Demands  2. Mobility  3. Self Care  4. Domestic Life  5. Community, Social and [place]   Number of elements that affect the Plan of Care: 4+: HIGH COMPLEXITY   CLINICAL PRESENTATION:   Presentation: Stable and uncomplicated: LOW COMPLEXITY   CLINICAL DECISION MAKIN [facility] Inpatient Short Form  How much difficulty does the patient currently have. .. Unable A Lot A Little None   1. Turning over in bed (including adjusting bedclothes, sheets and blankets)? [ ] 1   [X] 2   [ ] 3   [ ] 4   2. Sitting down on and standing up from a chair with arms ( e.g., wheelchair, bedside commode, etc.)   [ ] 1   [ ] 2   [ ] 3   [X] 4   3. Moving from lying on back to sitting on the side of the bed? [ ] 1   [X] 2   [ ] 3   [ ] 4   How much help from another person does the patient currently need. .. Total A Lot A Little None   4. Moving to and from a bed to a chair (including a wheelchair)? [ ] 1   [ ] 2   [ ] 3   [X] 4   5. Need to walk in hospital room? [ ] 1   [ ] 2   [ ] 3   [X] 4   6. Climbing 3-5 steps with a railing? [X] 1   [ ] 2   [ ] 3   [ ] 4   © , Trustees of [address] [place] [address] [address] [address], under license to [employer]. All rights reserved    Score:  Initial: 17 Most Recent: X (Date: 3/2/17 )     Interpretation of Tool:  Represents activities that are increasingly more difficult (i.e. Bed mobility, Transfers, Gait).        Score 24 23 22-20 19-15 14-10 9-7 6       Modifier CH CI CJ CK CL CM CN · Mobility - Walking and Moving Around:               - CURRENT STATUS:    CK - 40%-59% impaired, limited or restricted               - GOAL STATUS:           CJ - 20%-39% impaired, limited or restricted               - D/C STATUS:                       ---------------To be determined---------------  [place] / Product Type: Medicare /       Medical Necessity:     · Patient is expected to demonstrate progress in strength, balance, coordination, functional technique and endurance to improve safety during functional mobility. Reason for Services/Other Comments:  · Patient continues to require present interventions due to patient's inability to function at baseline level . Use of outcome tool(s) and clinical judgement create a POC that gives a: Clear prediction of patient's progress: LOW COMPLEXITY                 TREATMENT:      Pre-treatment Symptoms/Complaints:  Multiple c/o's from bed being uncomfortable to walker difficult to push. Pain: Initial: \"Pinching\" left hip and buttock. Post Session:  same      Therapeutic Activity: (    23 min): Therapeutic activities including Chair transfers, bed mobility, and standing/sitting balance with ADLs to improve mobility, strength, balance and activity tolerance. Required minimal Manual cues; Safety awareness training;Verbal cues to promote static and dynamic balance in standing. Gait Training ():  Gait training to improve and/or restore physical functioning as related to mobility, strength, balance and endurance. Ambulated 200 Feet (ft) with minimal assistance/contact guard assist and minimal verbal cues related to their trunk positionto promote proper body alignment. Instruction in performance of walker managment to correct safety awareness.     Assistive Device: Walker, rolling  Ambulation - Level of Assistance: Stand-by asssistance, Contact guard assistance  Distance (ft): 200 Feet (ft)  Interventions: Manual cues, Safety awareness training, Verbal cues  Duration: 13 Minutes       Braces/Orthotics/Lines/Etc:   · None  Treatment/Session Assessment:    · Response to Treatment:   improved mobility with transfers/gait  · Interdisciplinary Collaboration:  · Physical Therapy Assistant and Registered Nurse  · After treatment position/precautions:  · Bed alarm/tab alert on, Bed/Chair-wheels locked, Call light within reach, RN notified, Family at bedside and on toilet with RN aware  · Compliance with Program/Exercises: Will assess as treatment progresses. · Recommendations/Intent for next treatment session: \"Next visit will focus on advancements to more challenging activities and reduction in assistance provided\".   Total Treatment Duration:  PT Patient Time In/Time Out  Time In: 0954  Time Out: 1160 [facility]

## 2017-03-04 NOTE — PROGRESS NOTES
Warfarin dosing per pharmacist    Greg Lipscomb is a 80 y.o. male. Height: 5' 10\" (177.8 cm)    Weight: 68 kg (150 lb)    Indication:  Afib    Goal INR:  2-3    Home dose:  2.5 mg daily except 1.25 mg on Monday and Friday per care everywhere coumadin clinic note through [facility] cardiology on 2/21/17    Risk factors or significant drug interactions:  N/A    Other anticoagulants:  N/A    Daily Monitoring  Date  INR     Warfarin dose HGB              Notes  3/2  1.2  2.5 mg  15.8  Held since 2/24  3/3  1.1    1.25 mg + 2mgX1 ---  3/4  1.5  2.5 mg  ---      For now, will resume patient's regular home dose of 2.5 mg daily except 1.25 mg on Monday and Friday as the patient has recently been therapeutic on this dosing regimen per outpatient anticoag clinic notes. Pt received 3.25 mg total yesterday and INR 1.5 today. Will continue home dose tonight of 2.5 mg.      Thank you,  Kim Corley, PharmD  Clinical Pharmacist  [phone]

## 2017-03-06 NOTE — PROGRESS NOTES
Transition of Care Discharge Follow-up Questionnaire   Date/Time of Call:   3/5/17 4:07p   What was the patient hospitalized for? Renal cell carcinoma       Does the patient understand his/her diagnosis and/or treatment and what happened during the hospitalization? Patients daughter-in-law verbalized understanding of treatment and diagnosis. Did the patient receive discharge instructions? Yes   Review any discharge instructions (see notes in [facility]). Ask patient if they understand these. Do they have any questions? She verbalized understanding of instructions. Were home services ordered (nursing, PT, OT, ST, etc.)? No   If so, has the first visit occurred? If not, why? (Assist with coordination of services if necessary.) n/a   Was any DME ordered? No   If so, has it been received? If not, why?  (Assist with coordination of arranging DME orders if necessary.) n/a   Complete a review of all medications (new, continued and discontinued meds per the D/C instructions and medication tab in [facility]). Patient is compliant with medications. Were all new prescriptions filled? If not, why?  (Assist with obtainment of medications if necessary.) Yes   Does the patient understand the purpose and dosing instructions for all medications? (If patient has questions, provide explanation and education.) Patients DIL verbalizes patient understands purpose and dosing instructions for meds. Does the patient have any problems in performing ADLs? (If patient is unable to perform ADLs - what is the limiting factor(s)? Do they have a support system that can assist? If no support system is present, discuss possible assistance that they may be able to obtain.) Patient has a live in sitter that assists with ADLs such as bathing and dressing. Patient has children who check -in on patient and assist with needs as well. Does the patient have all follow-up appointments scheduled?   Has transportation been arranged? [facility] follow-up should be within 7 days of discharge; all others should have PCP follow-up within 7 days of discharge; follow-ups with other specialists as appropriate or ordered.) DIL will schedule patients f/u appointments tomorrow morning. DIL states family members will provide transportation. Any other questions or concerns expressed by the patient? No other questions or concerns were voiced by patients DIL to care coordinator. Care coordinator encouraged patient to call if she has needs or questions in regards to care.     ALTON Call Completed By: Ulysses Dryer, LPN [facility]  Care Coordinator

## 2017-03-23 NOTE — PROGRESS NOTES
Interventional Radiology Prep Area Handoff      Allergies   Allergen Reactions    Latex Shortness of Breath    Prednisone Other (comments)     \"I went 'bonkers' after the third round of it\"       IRSummary reviewed. Pt identified with two identifiers. Verified procedure with Patient and IR Provider as Bone Marrow Biopsy. Consent obtained: Awaiting MD H&P complete/updated: Awaiting MD MD airway complete: Awaiting MD    Sedation:Yes   NPO: Yes   Anticoagulation: Coumadin     Pt shaved: N/A   Antibiotics: Awaiting MD  Anesthesia notified: N/A    Additional Notes: INR = 1.4      Lines:  Peripherally Inserted Central Catheter:     Central Venous Catheter:     Venous Access Device:     Peripheral Intravenous Line:  Peripheral IV 03/23/17 Right Arm (Active)   Site Assessment Clean, dry, & intact 3/23/2017  2:38 PM   Phlebitis Assessment 0 3/23/2017  2:38 PM   Infiltration Assessment 0 3/23/2017  2:38 PM   Dressing Status Clean, dry, & intact 3/23/2017  2:38 PM   Dressing Type Tape;Transparent 3/23/2017  2:38 PM   Hub Color/Line Status Flushed 3/23/2017  2:38 PM     Hemodialysis Catheter:     Drain(s):       Labs verified: Yes  No results found for this or any previous visit (from the past 24 hour(s)).   Patient Vitals for the past 8 hrs:   Temp Pulse Resp BP SpO2   03/23/17 1423 97.8 °F (36.6 °C) 74 17 182/82 100 %

## 2017-03-23 NOTE — DISCHARGE INSTRUCTIONS
Bone Marrow Aspiration and Biopsy: What to Expect at Home  Your Recovery  The biopsy site may feel sore for several days. It can help to walk, take pain medicine, and put ice packs on the site. You will probably be able to return to work and your usual activities the day after the procedure. Your doctor or nurse will call you with the results of your test.  This care sheet gives you a general idea about how long it will take for you to recover. But each person recovers at a different pace. Follow the steps below to get better as quickly as possible. How can you care for yourself at home? Activity  · Rest when you feel tired. Getting enough sleep will help you recover. · You may drive when you are no longer taking pain pills and can quickly move your foot from the gas pedal to the brake. You must also be able to sit comfortably for a long period of time, even if you do not plan to go far. You might get caught in traffic. · Most people are able to return to work the day after the procedure. Medicines  · Your doctor will tell you if and when you can restart your medicines. He or she will also give you instructions about taking any new medicines. · If you take blood thinners, such as warfarin (Coumadin), clopidogrel (Plavix), or aspirin, be sure to talk to your doctor. He or she will tell you if and when to start taking those medicines again. Make sure that you understand exactly what your doctor wants you to do. · Be safe with medicines. Take pain medicines exactly as directed. ¨ If the doctor gave you a prescription medicine for pain, take it as prescribed. ¨ If you are not taking a prescription pain medicine, take an over-the-counter medicine such as acetaminophen (Tylenol), ibuprofen (Advil, Motrin), or naproxen (Aleve). Read and follow all instructions on the label. ¨ Do not take two or more pain medicines at the same time unless the doctor told you to.  Many pain medicines have acetaminophen, which is Tylenol. Too much acetaminophen (Tylenol) can be harmful. · If you think your pain medicine is making you sick to your stomach:  ¨ Take your medicine after meals (unless your doctor has told you not to). ¨ Ask your doctor for a different pain medicine. · If your doctor prescribed antibiotics, take them as directed. Do not stop taking them just because you feel better. Ice  · Put ice or a cold pack on the biopsy site for 10 to 20 minutes at a time. Put a thin cloth between the ice and your skin. Follow-up care is a key part of your treatment and safety. Be sure to make and go to all appointments, and call your doctor if you are having problems. It's also a good idea to know your test results and keep a list of the medicines you take. When should you call for help? Call 911 anytime you think you may need emergency care. For example, call if:  · You passed out (lost consciousness). Call your doctor now or seek immediate medical care if:  · You have signs of infection, such as:  ¨ Increased pain, swelling, warmth, or redness. ¨ Red streaks leading from the biopsy site. ¨ Pus draining from the biopsy site. ¨ Swollen lymph nodes in your neck, armpits, or groin. ¨ A fever. Watch closely for any changes in your health, and be sure to contact your doctor if:  · You are not getting better as expected. If you have any questions about your procedure, please call the Interventional Radiology department at [phone]. After business hours (5pm) and weekends, call the answering service at [phone] and ask for the Radiologist on call to be paged.      Interventional Radiology General Nurse Discharge    After general anesthesia or intravenous sedation, for 24 hours or while taking prescription Narcotics:  · Limit your activities  · Do not drive and operate hazardous machinery  · Do not make important personal or business decisions  · Do  not drink alcoholic beverages  · If you have not urinated within 8 hours after discharge, please contact your surgeon on call. * Please give a list of your current medications to your Primary Care Provider. * Please update this list whenever your medications are discontinued, doses are     changed, or new medications (including over-the-counter products) are added. * Please carry medication information at all times in case of emergency situations. These are general instructions for a healthy lifestyle:    No smoking/ No tobacco products/ Avoid exposure to second hand smoke  Surgeon General's Warning:  Quitting smoking now greatly reduces serious risk to your health. Obesity, smoking, and sedentary lifestyle greatly increases your risk for illness  A healthy diet, regular physical exercise & weight monitoring are important for maintaining a healthy lifestyle    You may be retaining fluid if you have a history of heart failure or if you experience any of the following symptoms:  Weight gain of 3 pounds or more overnight or 5 pounds in a week, increased swelling in our hands or feet or shortness of breath while lying flat in bed. Please call your doctor as soon as you notice any of these symptoms; do not wait until your next office visit. Recognize signs and symptoms of STROKE:  F-face looks uneven  A-arms unable to move or move unevenly  S-speech slurred or non-existent  T-time-call 911 as soon as signs and symptoms begin-DO NOT go       Back to bed or wait to see if you get better-TIME IS BRAIN.

## 2017-03-23 NOTE — PROCEDURES
Interventional Radiology Brief Procedure Note    Patient: Jessy Daigle MRN: [id number]  SSN: [id number]    YOB: 1934  Age: 80 y.o. Sex: male      Date of Procedure: 3/23/2017    Pre-Procedure Diagnosis: Plasmacytoma. Post-Procedure Diagnosis: SAME    Procedure(s): Image Guided Biopsy--right iliac bone marrow aspiration and core biopsy    Brief Description of Procedure: as above    Performed By: Susy Santacruz MD     Assistants: None    Anesthesia: Moderate Sedation    Estimated Blood Loss: Less than 10ml    Specimens: Pathology    Implants: None    Findings: Osteopenia.      Complications: None    Recommendations: 1 hour bedrest.       Follow Up: Dr Bruno Daigle    Signed By: Susy Santacruz MD     March 23, 2017

## 2017-03-23 NOTE — IP AVS SNAPSHOT
Current Discharge Medication List  
  
ASK your doctor about these medications Dose & Instructions Dispensing Information Comments Morning Noon Evening Bedtime  
 acetaminophen 500 mg tablet Commonly known as:  TYLENOL Your last dose was: Your next dose is: Take  by mouth every six (6) hours as needed for Pain. Refills:  0  
     
   
   
   
  
 amLODIPine 10 mg tablet Commonly known as:  Boris Mosqueda Your last dose was: Your next dose is:    
   
   
 Dose:  10 mg Take 1 Tab by mouth daily. Quantity:  90 Tab Refills:  0  
     
   
   
   
  
 diazePAM 2 mg tablet Commonly known as:  VALIUM Your last dose was: Your next dose is:    
   
   
 Dose:  2 mg Take 1 Tab by mouth every twelve (12) hours as needed (Muscle spasms). Max Daily Amount: 4 mg. Quantity:  30 Tab Refills:  0  
     
   
   
   
  
 docusate sodium 100 mg capsule Commonly known as:  Petrona Dilling Your last dose was: Your next dose is:    
   
   
 Dose:  100 mg Take 1 Cap by mouth two (2) times a day for 90 days. Quantity:  60 Cap Refills:  0  
     
   
   
   
  
 fluocinoNIDE 0.05 % topical cream  
Commonly known as:  LIDEX Your last dose was: Your next dose is:    
   
   
 Apply  to affected area two (2) times a day. Quantity:  60 g Refills:  1  
     
   
   
   
  
 fluticasone 50 mcg/actuation nasal spray Commonly known as:  Gertrude Thomas Your last dose was: Your next dose is:    
   
   
 Dose:  2 Spray 2 Sprays by Both Nostrils route as needed. Refills:  0 HYDROcodone-acetaminophen 7.5-325 mg per tablet Commonly known as:  Angella Menchaca Your last dose was: Your next dose is:    
   
   
 Dose:  1 Tab Take 1 Tab by mouth every four (4) hours as needed for Pain. Max Daily Amount: 6 Tabs. Quantity:  90 Tab Refills:  0 levothyroxine 75 mcg tablet Commonly known as:  SYNTHROID Your last dose was: Your next dose is: TAKE 1 TABLET BY MOUTH DAILY BEFORE BREAKFAST Quantity:  90 Tab Refills:  1  
     
   
   
   
  
 melatonin 3 mg tablet Your last dose was: Your next dose is: Take  by mouth nightly. Refills:  0  
     
   
   
   
  
 metoprolol tartrate 50 mg tablet Commonly known as:  LOPRESSOR Your last dose was: Your next dose is: TAKE 1/2 TABLET BY MOUTH EVERY 12 HOURS Quantity:  90 Tab Refills:  0  
     
   
   
   
  
 pravastatin 40 mg tablet Commonly known as:  PRAVACHOL Your last dose was: Your next dose is:    
   
   
 Dose:  40 mg Take 40 mg by mouth nightly. Refills:  0  
     
   
   
   
  
 senna-docusate 8.6-50 mg per tablet Commonly known as:  Zelphia Clonts Your last dose was: Your next dose is:    
   
   
 Dose:  1 Tab Take 1 Tab by mouth two (2) times a day. Quantity:  60 Tab Refills:  2  
     
   
   
   
  
 warfarin 2.5 mg tablet Commonly known as:  COUMADIN Your last dose was: Your next dose is:    
   
   
 1/2 to 1 tablet qhs or as directed  Indications: PREVENT THROMBOEMBOLISM IN CHRONIC ATRIAL FIBRILLATION Quantity:  90 Tab Refills:  3

## 2017-03-23 NOTE — PROGRESS NOTES
TRANSFER - OUT REPORT:    Verbal report given to AMARA Webster (name) on Darius Coker  being transferred to IR Recovery (unit) for routine progression of care       Report consisted of patients Situation, Background, Assessment and   Recommendations(SBAR). Information from the following report(s) Procedure Summary and MAR was reviewed with the receiving nurse. Opportunity for questions and clarification was provided. Conscious Sedation:   75 Mcg of Fentanyl administered  1.5 Mg of Versed administered    Pt tolerated procedure well.      Visit Vitals    /72    Pulse 95    Temp 97.8 °F (36.6 °C)    Resp 18    Ht 5' 10\" (1.778 m)    Wt 68 kg (150 lb)    SpO2 100%    BMI 21.52 kg/m2     Past Medical History:   Diagnosis Date    AAA (abdominal aortic aneurysm) ([address] 11/20/2012    clipped 2005 per pt    Allergic rhinitis, cause unspecified 11/20/2012    Aneurysm ([address]     clipped 2008-    Atrial fibrillation ([address] 11/20/2012    Cancer ([address]     Chest pain 11/9/2015    Erectile dysfunction 11/9/2015    HTN (hypertension) 2/6/14    controlled with meds    Hypertrophy of prostate with urinary obstruction and other lower urinary tract symptoms (LUTS)     Impotence of organic origin     Kidney stones 11/20/2012    Malaise 11/9/2015    Other vitamin B12 deficiency anemia 11/20/2012    Poor historian     Right kidney mass     Unspecified adverse effect of anesthesia     delayed awakening s/p AAA repair (only time)    Unspecified disorder of kidney and ureter     Unspecified hypothyroidism 11/20/2012    Vertigo     etiology unknown     Peripheral IV 03/23/17 Right Arm (Active)   Site Assessment Clean, dry, & intact 3/23/2017  2:38 PM   Phlebitis Assessment 0 3/23/2017  2:38 PM   Infiltration Assessment 0 3/23/2017  2:38 PM   Dressing Status Clean, dry, & intact 3/23/2017  2:38 PM   Dressing Type Tape;Transparent 3/23/2017  2:38 PM   Hub Color/Line Status Flushed 3/23/2017  2:38 PM

## 2017-03-23 NOTE — PROGRESS NOTES
TRANSFER - IN REPORT:    Verbal report received from Geneva RN(name) on Radha Anne  being received from IR procedure(unit) for routine progression of care      Report consisted of patients Situation, Background, Assessment and   Recommendations(SBAR). Information from the following report(s) SBAR, Intake/Output and MAR was reviewed with the receiving nurse. Opportunity for questions and clarification was provided. Assessment completed upon patients arrival to unit and care assumed.

## 2017-03-23 NOTE — IP AVS SNAPSHOT
[address] 
[phone] Patient: Darius Coker MRN: [id number] TVT:2/3/0084 You are allergic to the following Allergen Reactions Latex Shortness of Breath Prednisone Other (comments) \"I went 'bonkers' after the third round of it\" Recent Documentation Height Weight BMI Smoking Status 1.778 m 68 kg 21.52 kg/m2 Never Smoker Unresulted Labs Order Current Status BONE MARROW PREP & ARITA STAIN Collected (03/23/17 1454) Emergency Contacts Name Discharge Info Relation Home Work Mobile [address] CAREGIVER [3] Son [22] [phone] About your hospitalization You were admitted on:  March 23, 2017 You last received care in the:  D RADIOLOGY CT SCAN You were discharged on:  March 23, 2017 Unit phone number:  [phone] Why you were hospitalized Your primary diagnosis was:  Not on File Providers Seen During Your Hospitalizations Provider Role Specialty Primary office phone Bar Sanchez MD Attending Provider Internal Medicine [phone] Your Primary Care Physician (PCP) Primary Care Physician Office Phone Office Fax Jong Horn [phone] Follow-up Information None Your Appointments Wednesday March 29, 2017 12:45 PM EDT  
LAB with [address]  
[address] [place] [address] INSURANCE [facility] [place] [address]  
[phone] Wednesday March 29, 2017  1:15 PM EDT Follow Up with MD [facility]/ [facility] [address]  
[phone] Wednesday March 29, 2017  1:30 PM EDT Follow Up with EDDIE Kohli [facility] [place] [address]  
[phone] Current Discharge Medication List  
  
ASK your doctor about these medications Dose & Instructions Dispensing Information Comments Morning Noon Evening Bedtime  
 acetaminophen 500 mg tablet Commonly known as:  TYLENOL Your last dose was: Your next dose is: Take  by mouth every six (6) hours as needed for Pain. Refills:  0  
     
   
   
   
  
 amLODIPine 10 mg tablet Commonly known as:  Ivone Pace Your last dose was: Your next dose is:    
   
   
 Dose:  10 mg Take 1 Tab by mouth daily. Quantity:  90 Tab Refills:  0  
     
   
   
   
  
 diazePAM 2 mg tablet Commonly known as:  VALIUM Your last dose was: Your next dose is:    
   
   
 Dose:  2 mg Take 1 Tab by mouth every twelve (12) hours as needed (Muscle spasms). Max Daily Amount: 4 mg. Quantity:  30 Tab Refills:  0  
     
   
   
   
  
 docusate sodium 100 mg capsule Commonly known as:  Leroy El Your last dose was: Your next dose is:    
   
   
 Dose:  100 mg Take 1 Cap by mouth two (2) times a day for 90 days. Quantity:  60 Cap Refills:  0  
     
   
   
   
  
 fluocinoNIDE 0.05 % topical cream  
Commonly known as:  LIDEX Your last dose was: Your next dose is:    
   
   
 Apply  to affected area two (2) times a day. Quantity:  60 g Refills:  1  
     
   
   
   
  
 fluticasone 50 mcg/actuation nasal spray Commonly known as:  Kenny Shetty Your last dose was: Your next dose is:    
   
   
 Dose:  2 Spray 2 Sprays by Both Nostrils route as needed. Refills:  0 HYDROcodone-acetaminophen 7.5-325 mg per tablet Commonly known as:  Yancy Arts Your last dose was: Your next dose is:    
   
   
 Dose:  1 Tab Take 1 Tab by mouth every four (4) hours as needed for Pain. Max Daily Amount: 6 Tabs. Quantity:  90 Tab Refills:  0  
     
   
   
   
  
 levothyroxine 75 mcg tablet Commonly known as:  SYNTHROID Your last dose was: Your next dose is: TAKE 1 TABLET BY MOUTH DAILY BEFORE BREAKFAST Quantity:  90 Tab Refills:  1  
     
   
   
   
  
 melatonin 3 mg tablet Your last dose was: Your next dose is: Take  by mouth nightly. Refills:  0  
     
   
   
   
  
 metoprolol tartrate 50 mg tablet Commonly known as:  LOPRESSOR Your last dose was: Your next dose is: TAKE 1/2 TABLET BY MOUTH EVERY 12 HOURS Quantity:  90 Tab Refills:  0  
     
   
   
   
  
 pravastatin 40 mg tablet Commonly known as:  PRAVACHOL Your last dose was: Your next dose is:    
   
   
 Dose:  40 mg Take 40 mg by mouth nightly. Refills:  0  
     
   
   
   
  
 senna-docusate 8.6-50 mg per tablet Commonly known as:  Blake Brittnee Your last dose was: Your next dose is:    
   
   
 Dose:  1 Tab Take 1 Tab by mouth two (2) times a day. Quantity:  60 Tab Refills:  2  
     
   
   
   
  
 warfarin 2.5 mg tablet Commonly known as:  COUMADIN Your last dose was: Your next dose is:    
   
   
 1/2 to 1 tablet qhs or as directed  Indications: PREVENT THROMBOEMBOLISM IN CHRONIC ATRIAL FIBRILLATION Quantity:  90 Tab Refills:  3 Discharge Instructions Bone Marrow Aspiration and Biopsy: What to Expect at [facility] Your Recovery The biopsy site may feel sore for several days. It can help to walk, take pain medicine, and put ice packs on the site. You will probably be able to return to work and your usual activities the day after the procedure. Your doctor or nurse will call you with the results of your test. 
This care sheet gives you a general idea about how long it will take for you to recover. But each person recovers at a different pace.  Follow the steps below to get better as quickly as possible. How can you care for yourself at home? Activity · Rest when you feel tired. Getting enough sleep will help you recover. · You may drive when you are no longer taking pain pills and can quickly move your foot from the gas pedal to the brake. You must also be able to sit comfortably for a long period of time, even if you do not plan to go far. You might get caught in traffic. · Most people are able to return to work the day after the procedure. Medicines · Your doctor will tell you if and when you can restart your medicines. He or she will also give you instructions about taking any new medicines. · If you take blood thinners, such as warfarin (Coumadin), clopidogrel (Plavix), or aspirin, be sure to talk to your doctor. He or she will tell you if and when to start taking those medicines again. Make sure that you understand exactly what your doctor wants you to do. · Be safe with medicines. Take pain medicines exactly as directed. ¨ If the doctor gave you a prescription medicine for pain, take it as prescribed. ¨ If you are not taking a prescription pain medicine, take an over-the-counter medicine such as acetaminophen (Tylenol), ibuprofen (Advil, Motrin), or naproxen (Aleve). Read and follow all instructions on the label. ¨ Do not take two or more pain medicines at the same time unless the doctor told you to. Many pain medicines have acetaminophen, which is Tylenol. Too much acetaminophen (Tylenol) can be harmful. · If you think your pain medicine is making you sick to your stomach: 
¨ Take your medicine after meals (unless your doctor has told you not to). ¨ Ask your doctor for a different pain medicine. · If your doctor prescribed antibiotics, take them as directed. Do not stop taking them just because you feel better. Ice · Put ice or a cold pack on the biopsy site for 10 to 20 minutes at a time. Put a thin cloth between the ice and your skin. Follow-up care is a key part of your treatment and safety. Be sure to make and go to all appointments, and call your doctor if you are having problems. It's also a good idea to know your test results and keep a list of the medicines you take. When should you call for help? Call 911 anytime you think you may need emergency care. For example, call if: 
· You passed out (lost consciousness). Call your doctor now or seek immediate medical care if: 
· You have signs of infection, such as: 
¨ Increased pain, swelling, warmth, or redness. ¨ Red streaks leading from the biopsy site. ¨ Pus draining from the biopsy site. ¨ Swollen lymph nodes in your neck, armpits, or groin. ¨ A fever. Watch closely for any changes in your health, and be sure to contact your doctor if: 
· You are not getting better as expected. If you have any questions about your procedure, please call the Interventional Radiology department at [phone]. After business hours (5pm) and weekends, call the answering service at [phone] and ask for the Radiologist on call to be paged. Interventional Radiology General Nurse Discharge After general anesthesia or intravenous sedation, for 24 hours or while taking prescription Narcotics: · Limit your activities · Do not drive and operate hazardous machinery · Do not make important personal or business decisions · Do  not drink alcoholic beverages · If you have not urinated within 8 hours after discharge, please contact your surgeon on call. * Please give a list of your current medications to your Primary Care Provider. * Please update this list whenever your medications are discontinued, doses are 
   changed, or new medications (including over-the-counter products) are added. * Please carry medication information at all times in case of emergency situations. These are general instructions for a healthy lifestyle: No smoking/ No tobacco products/ Avoid exposure to second hand smoke Surgeon General's Warning:  Quitting smoking now greatly reduces serious risk to your health. Obesity, smoking, and sedentary lifestyle greatly increases your risk for illness A healthy diet, regular physical exercise & weight monitoring are important for maintaining a healthy lifestyle You may be retaining fluid if you have a history of heart failure or if you experience any of the following symptoms:  Weight gain of 3 pounds or more overnight or 5 pounds in a week, increased swelling in our hands or feet or shortness of breath while lying flat in bed. Please call your doctor as soon as you notice any of these symptoms; do not wait until your next office visit. Recognize signs and symptoms of STROKE: 
F-face looks uneven A-arms unable to move or move unevenly S-speech slurred or non-existent T-time-call 911 as soon as signs and symptoms begin-DO NOT go Back to bed or wait to see if you get better-TIME IS BRAIN. Discharge Orders None ACO Transitions of Care Introducing [employer] [address] Khan offers a voluntary care coordination program to provide high quality service and care to [facility] fee-for-service beneficiaries. Colin  was designed to help you enhance your health and well-being through the following services: ? Transitions of Care  support for individuals who are transitioning from one care setting to another (example: Hospital to home). ? Chronic and Complex Care Coordination  support for individuals and caregivers of those with serious or chronic illnesses or with more than one chronic (ongoing) condition and those who take a number of different medications.   
 
 
If you meet specific medical criteria, a [facility] [address] may call you directly to coordinate your care with your primary care physician and your other care providers. For questions about the [facility] programs, please, contact your physicians office. For general questions or additional information about Accountable Care Organizations: 
Please visit www.medicare.gov/acos. html or call 1-800-MEDICARE (8-598.417.3171) TT users should call 9-516.514.6058. General Information Please provide this summary of care documentation to your next provider. Patient Signature:  ____________________________________________________________ Date:  ____________________________________________________________  
  
[facility] Signature:  ____________________________________________________________ Date:  ____________________________________________________________

## 2017-03-23 NOTE — H&P
Department of Interventional Radiology  [phone]    History and Physical    Patient:  Ambika Tavarez MRN:  [id number]  SSN:  [id number]    YOB: 1934  Age:  80 y.o. Sex:  male      Primary Care Provider:  Marky Bhatti MD  Referring Physician:  Griselda Hawley MD    Subjective:     Chief Complaint: biopsy    History of the Present Illness: The patient is a 80 y.o. male who presents for bone marrow biopsy. Plasmacytoma. Metastatic renal cell carcinoma. NPO. INR 1.4, coumadin held. Past Medical History:   Diagnosis Date    AAA (abdominal aortic aneurysm) ([address] 11/20/2012    clipped 2005 per pt    Allergic rhinitis, cause unspecified 11/20/2012    Aneurysm ([address]     clipped 2008-    Atrial fibrillation ([address] 11/20/2012    Cancer ([facility]     Chest pain 11/9/2015    Erectile dysfunction 11/9/2015    HTN (hypertension) 2/6/14    controlled with meds    Hypertrophy of prostate with urinary obstruction and other lower urinary tract symptoms (LUTS)     Impotence of organic origin     Kidney stones 11/20/2012    Malaise 11/9/2015    Other vitamin B12 deficiency anemia 11/20/2012    Poor historian     Right kidney mass     Unspecified adverse effect of anesthesia     delayed awakening s/p AAA repair (only time)    Unspecified disorder of kidney and ureter     Unspecified hypothyroidism 11/20/2012    Vertigo     etiology unknown     Past Surgical History:   Procedure Laterality Date    HX AAA REPAIR  2012    HX HERNIA REPAIR Bilateral 2000?     jovani inguinal    HX KNEE REPLACEMENT  06/08/2012    Right    HX ORTHOPAEDIC  2012    partial replacement - right knee    HX OTHER SURGICAL      triple A surgery    HX OTHER SURGICAL      cystoscopy with stent placement    HX TONSILLECTOMY  age 6   [de-identified] UROLOGICAL      cysto - laser litho and stent multiple    VASCULAR SURGERY PROCEDURE UNLIST  4/2008    abdominal aortic aneursym repair        Review of Systems:    Pertinent items are noted in the History of Present Illness. Current Outpatient Prescriptions   Medication Sig    metoprolol tartrate (LOPRESSOR) 50 mg tablet TAKE 1/2 TABLET BY MOUTH EVERY 12 HOURS    HYDROcodone-acetaminophen (NORCO) 7.5-325 mg per tablet Take 1 Tab by mouth every four (4) hours as needed for Pain. Max Daily Amount: 6 Tabs.  senna-docusate (PERICOLACE) 8.6-50 mg per tablet Take 1 Tab by mouth two (2) times a day.  amLODIPine (NORVASC) 10 mg tablet Take 1 Tab by mouth daily.  docusate sodium (COLACE) 100 mg capsule Take 1 Cap by mouth two (2) times a day for 90 days.  pravastatin (PRAVACHOL) 40 mg tablet Take 40 mg by mouth nightly.  levothyroxine (SYNTHROID) 75 mcg tablet TAKE 1 TABLET BY MOUTH DAILY BEFORE BREAKFAST    warfarin (COUMADIN) 2.5 mg tablet 1/2 to 1 tablet qhs or as directed  Indications: PREVENT THROMBOEMBOLISM IN CHRONIC ATRIAL FIBRILLATION (Patient taking differently: Take 2.5 mg by mouth daily. 1/2 tab Monday and Friday  Indications: PREVENT THROMBOEMBOLISM IN CHRONIC ATRIAL FIBRILLATION)    fluticasone (FLONASE) 50 mcg/actuation nasal spray 2 Sprays by Both Nostrils route as needed.  diazePAM (VALIUM) 2 mg tablet Take 1 Tab by mouth every twelve (12) hours as needed (Muscle spasms). Max Daily Amount: 4 mg.  fluocinoNIDE (LIDEX) 0.05 % topical cream Apply  to affected area two (2) times a day.  melatonin 3 mg tablet Take  by mouth nightly.  acetaminophen (TYLENOL) 500 mg tablet Take  by mouth every six (6) hours as needed for Pain. No current facility-administered medications for this encounter.          Allergies   Allergen Reactions    Latex Shortness of Breath    Prednisone Other (comments)     \"I went 'bonkers' after the third round of it\"       Family History   Problem Relation Age of Onset    Other Father      kidney stones    Cancer Sister     Cancer Sister      breast     Social History   Substance Use Topics    Smoking status: Never Smoker  Smokeless tobacco: Never Used    Alcohol use No        Objective:       Physical Examination:    Vitals:    03/23/17 1423   BP: 182/82   Pulse: 74   Resp: 17   Temp: 97.8 °F (36.6 °C)   SpO2: 100%   Weight: 68 kg (150 lb)   Height: 5' 10\" (1.778 m)     Blood pressure 182/82, pulse 74, temperature 97.8 °F (36.6 °C), resp. rate 17, height 5' 10\" (1.778 m), weight 68 kg (150 lb), SpO2 100 %.   HEART: regular rate and rhythm  LUNG: clear to auscultation bilaterally  ABDOMEN: normal findings: soft, non-tender  EXTREMITIES: warm, no edema    Laboratory:     Lab Results   Component Value Date/Time    Sodium 143 03/17/2017 01:20 PM    Sodium 140 03/15/2017 01:19 PM    Potassium 4.0 03/17/2017 01:20 PM    Potassium 4.1 03/15/2017 01:19 PM    Chloride 106 03/17/2017 01:20 PM    Chloride 106 03/15/2017 01:19 PM    CO2 30 03/17/2017 01:20 PM    CO2 25 03/15/2017 01:19 PM    Anion gap 7 03/17/2017 01:20 PM    Anion gap 9 03/15/2017 01:19 PM    Glucose 101 03/17/2017 01:20 PM    Glucose 116 03/15/2017 01:19 PM    BUN 25 03/17/2017 01:20 PM    BUN 28 03/15/2017 01:19 PM    Creatinine 1.56 03/17/2017 01:20 PM    Creatinine 1.54 03/15/2017 01:19 PM    GFR est AA 55 03/17/2017 01:20 PM    GFR est AA 56 03/15/2017 01:19 PM    GFR est non-AA 45 03/17/2017 01:20 PM    GFR est non-AA 46 03/15/2017 01:19 PM    Calcium 9.6 03/17/2017 01:20 PM    Calcium 10.0 03/15/2017 01:19 PM    Magnesium 2.6 03/17/2017 01:20 PM    Magnesium 2.6 03/15/2017 01:19 PM    Phosphorus 1.9 06/10/2015 04:33 AM    Phosphorus 2.8 06/09/2015 03:49 AM    Albumin 3.2 03/17/2017 01:20 PM    Albumin 3.3 03/15/2017 01:19 PM    Protein, total 7.0 03/17/2017 01:20 PM    Protein, total 7.0 03/15/2017 01:19 PM    Globulin 3.8 03/17/2017 01:20 PM    Globulin 3.7 03/15/2017 01:19 PM    A-G Ratio 0.8 03/17/2017 01:20 PM    A-G Ratio 0.9 03/15/2017 01:19 PM    AST (SGOT) 16 03/17/2017 01:20 PM    AST (SGOT) 18 03/15/2017 01:19 PM    ALT (SGPT) 24 03/17/2017 01:20 PM    ALT (SGPT) 22 03/15/2017 01:19 PM     Lab Results   Component Value Date/Time    WBC 8.1 03/17/2017 01:20 PM    WBC 15.1 03/15/2017 01:19 PM    HGB 14.9 03/17/2017 01:20 PM    HGB 15.0 03/15/2017 01:19 PM    HCT 45.5 03/17/2017 01:20 PM    HCT 45.2 03/15/2017 01:19 PM    PLATELET 537 00/46/4837 01:20 PM    PLATELET 894 35/79/1533 01:19 PM     Lab Results   Component Value Date/Time    aPTT 64.8 03/17/2017 01:20 PM    aPTT 34.2 02/24/2017 02:14 PM    Prothrombin time 25.9 03/21/2017 02:39 PM    Prothrombin time >90.0 03/17/2017 01:20 PM    INR 2.2 03/21/2017 02:39 PM    INR >9.0 03/17/2017 01:20 PM       Assessment:     plasmacytoma    Plan:     Planned Procedure:  Bone marrow biopsy    Risks, benefits, and alternatives reviewed with patient and he agrees to proceed with the procedure.       Signed By: Effie Murillo PA-C     March 23, 2017

## 2017-03-23 NOTE — PROGRESS NOTES
Recovery period without difficulty. Pt alert and oriented and denies pain. Dressing is clean, dry, and intact. Reviewed discharge instructions with patient and son, Pau Crabtree, both verbalized understanding. Pt escorted to [facility] discharge area via wheelchair. Vital signs and Spencer score completed. Patient instructed to restart Coumadin on Saturday. Pt's follow up studies are next week.

## 2017-04-12 NOTE — PROGRESS NOTES
SW followed up with pt at MD visit. SW completed visit with MD, palliative NP Jay Campbell, RN Navigator Kai Summers, pt, pt's son, and pt's dtr-in-law. Pt stated he wanted to defer tx decision until his next visit in 3 months as he was uncertain of the quality of life chemo could provide and general uncertainty about his health. SW provided psychosocial support and validated pt's self determination, emphasizing his strength in choice. Pt's son verbalized concerns about pt's coping and pt stated \"don't be talking about that here, that's not their job. \" SW and NP provided education about our services and encouraged pt and family to verbalize any concerns. They verbalized understanding. No other issues identified. SW provided SW contact information and encouraged pt and family to call with questions or concerns. They verbalized understanding. CARY intends to follow up with pt at next appt.